# Patient Record
Sex: MALE | Race: BLACK OR AFRICAN AMERICAN | NOT HISPANIC OR LATINO | Employment: FULL TIME | URBAN - METROPOLITAN AREA
[De-identification: names, ages, dates, MRNs, and addresses within clinical notes are randomized per-mention and may not be internally consistent; named-entity substitution may affect disease eponyms.]

---

## 2023-06-30 RX ORDER — AMLODIPINE BESYLATE 5 MG/1
1 TABLET ORAL DAILY
COMMUNITY
Start: 2021-02-19 | End: 2023-07-07 | Stop reason: SDUPTHER

## 2023-06-30 RX ORDER — ATENOLOL AND CHLORTHALIDONE TABLET 50; 25 MG/1; MG/1
1 TABLET ORAL DAILY
COMMUNITY
Start: 2018-07-17 | End: 2023-07-07 | Stop reason: SDUPTHER

## 2023-07-07 ENCOUNTER — OFFICE VISIT (OUTPATIENT)
Dept: PRIMARY CARE | Facility: CLINIC | Age: 48
End: 2023-07-07
Payer: COMMERCIAL

## 2023-07-07 VITALS
HEIGHT: 70 IN | TEMPERATURE: 98.2 F | DIASTOLIC BLOOD PRESSURE: 86 MMHG | RESPIRATION RATE: 17 BRPM | BODY MASS INDEX: 37.55 KG/M2 | OXYGEN SATURATION: 96 % | HEART RATE: 83 BPM | WEIGHT: 262.3 LBS | SYSTOLIC BLOOD PRESSURE: 134 MMHG

## 2023-07-07 DIAGNOSIS — E66.09 CLASS 2 OBESITY DUE TO EXCESS CALORIES WITHOUT SERIOUS COMORBIDITY WITH BODY MASS INDEX (BMI) OF 37.0 TO 37.9 IN ADULT: ICD-10-CM

## 2023-07-07 DIAGNOSIS — I10 HYPERTENSION, UNSPECIFIED TYPE: ICD-10-CM

## 2023-07-07 DIAGNOSIS — Z00.00 PHYSICAL EXAM: Primary | ICD-10-CM

## 2023-07-07 PROCEDURE — 3008F BODY MASS INDEX DOCD: CPT | Performed by: NURSE PRACTITIONER

## 2023-07-07 PROCEDURE — 1036F TOBACCO NON-USER: CPT | Performed by: NURSE PRACTITIONER

## 2023-07-07 PROCEDURE — 3075F SYST BP GE 130 - 139MM HG: CPT | Performed by: NURSE PRACTITIONER

## 2023-07-07 PROCEDURE — 99396 PREV VISIT EST AGE 40-64: CPT | Performed by: NURSE PRACTITIONER

## 2023-07-07 PROCEDURE — 3079F DIAST BP 80-89 MM HG: CPT | Performed by: NURSE PRACTITIONER

## 2023-07-07 RX ORDER — AMLODIPINE BESYLATE 5 MG/1
5 TABLET ORAL DAILY
Qty: 90 TABLET | Refills: 3 | Status: SHIPPED | OUTPATIENT
Start: 2023-07-07 | End: 2024-07-06

## 2023-07-07 RX ORDER — ATENOLOL AND CHLORTHALIDONE TABLET 50; 25 MG/1; MG/1
1 TABLET ORAL DAILY
Qty: 90 TABLET | Refills: 3 | Status: SHIPPED | OUTPATIENT
Start: 2023-07-07 | End: 2024-07-06

## 2023-07-07 ASSESSMENT — PATIENT HEALTH QUESTIONNAIRE - PHQ9
2. FEELING DOWN, DEPRESSED OR HOPELESS: NOT AT ALL
SUM OF ALL RESPONSES TO PHQ9 QUESTIONS 1 AND 2: 0
1. LITTLE INTEREST OR PLEASURE IN DOING THINGS: NOT AT ALL

## 2023-07-07 ASSESSMENT — PAIN SCALES - GENERAL: PAINLEVEL: 0-NO PAIN

## 2023-07-07 NOTE — PATIENT INSTRUCTIONS
"Continue medications as prescribed.  Healthy diet and drink plenty of water.  Please have labs drawn-You will be notified with abnormal results only.    SEE MY CHART FOR RESULTS. If you have any questions please do not hesitate to notify our office.    Please schedule all necessary health screenings ophthalmology and dentist.    Follow-up in 6 MONTHS.  Call office any new concerns.      For weight loss(suggestions)  -whole food diet recommended.  Eating plenty of plant-based foods.  -May try intermittent fasting 16-hour fast and a 8-hour window to eat-there is a movie on [employer] prime call \"fasting\".  -Limit red meat  -Avoid processed foods.  -Increase water intake-please try to drink one half of your body weight in ounces  -Weight yourself every morning  -Get 30 minutes of exercise daily.  -Each meal should have at least 3 to 4 ounces of protein this includes lentils beans tofu greens  -May also do weight training weight training boost to metabolism and help burn fat.  -Sleep is important for weight loss at least 8 hours a night if possible.  -Try to keep a food journal log everything you eat and drink.   - There is an elin you can use called my fitness pal.        "

## 2023-07-07 NOTE — PROGRESS NOTES
"Subjective   Patient ID: Adryan Perez is a 48 y.o. male who presents for Annual Exam (Patient reports that his wife states he sleeps more. ) and Med Refill.    HPI     Patient presents to clinic for yearly exam.  Patient with past medical history of hypertension.    He states he has been doing well-no specific complaints today.   He states his fiancée is concerned and states he has been sleeping more.   He does not feel as though he is sleeping more.  Patient works first shift states he goes to bed at approximately 11 PM and wakes up at 5 AM and when he gets home he naps.  No complaints of snoring.    Appetite normal bowel and bladder normal.      Patient has had a 19 pound weight gain in the last year.  He states he is in a new relationship and admits to eating more and traveling more.  Discussed diet and weight loss with patient.      Review of Systems   All other systems reviewed and are negative.      Objective   /86 (BP Location: Right arm, Patient Position: Sitting, BP Cuff Size: Large adult)   Pulse 83   Temp 36.8 °C (98.2 °F) (Temporal)   Resp 17   Ht 1.778 m (5' 10\")   Wt 119 kg (262 lb 4.8 oz)   SpO2 93%   BMI 37.64 kg/m²     Physical Exam  Vitals reviewed.   Constitutional:       Appearance: Normal appearance.   HENT:      Right Ear: Tympanic membrane and external ear normal.      Left Ear: Tympanic membrane and external ear normal.      Mouth/Throat:      Mouth: Mucous membranes are moist.      Pharynx: Oropharynx is clear.   Eyes:      Pupils: Pupils are equal, round, and reactive to light.   Neck:      Thyroid: No thyroid mass, thyromegaly or thyroid tenderness.   Cardiovascular:      Rate and Rhythm: Normal rate and regular rhythm.   Pulmonary:      Effort: Pulmonary effort is normal.      Breath sounds: Normal breath sounds.   Abdominal:      General: Bowel sounds are normal.      Palpations: Abdomen is soft.   Musculoskeletal:         General: Normal range of motion.      Cervical " back: Normal range of motion and neck supple.   Skin:     General: Skin is warm and dry.   Neurological:      Mental Status: He is alert and oriented to person, place, and time.   Psychiatric:         Mood and Affect: Mood normal.         Assessment/Plan   Problem List Items Addressed This Visit    None  Visit Diagnoses       Physical exam    -  Primary    Relevant Orders    CBC    Comprehensive Metabolic Panel    Lipid Panel    Hemoglobin A1C    Prostate Specific Antigen, Screen    Tsh With Reflex To Free T4 If Abnormal    Vitamin D 25-Hydroxy,Total    Colonoscopy    Hypertension, unspecified type        Relevant Medications    amLODIPine (Norvasc) 5 mg tablet    atenoloL-chlorthalidone (Tenoretic) 50-25 mg tablet

## 2023-08-05 ENCOUNTER — LAB (OUTPATIENT)
Dept: LAB | Facility: LAB | Age: 48
End: 2023-08-05
Payer: COMMERCIAL

## 2023-08-05 DIAGNOSIS — Z00.00 PHYSICAL EXAM: ICD-10-CM

## 2023-08-05 LAB
ALANINE AMINOTRANSFERASE (SGPT) (U/L) IN SER/PLAS: 34 U/L (ref 10–52)
ALBUMIN (G/DL) IN SER/PLAS: 4.5 G/DL (ref 3.4–5)
ALKALINE PHOSPHATASE (U/L) IN SER/PLAS: 102 U/L (ref 33–120)
ANION GAP IN SER/PLAS: 14 MMOL/L (ref 10–20)
ASPARTATE AMINOTRANSFERASE (SGOT) (U/L) IN SER/PLAS: 23 U/L (ref 9–39)
BILIRUBIN TOTAL (MG/DL) IN SER/PLAS: 0.5 MG/DL (ref 0–1.2)
CALCIDIOL (25 OH VITAMIN D3) (NG/ML) IN SER/PLAS: 25 NG/ML
CALCIUM (MG/DL) IN SER/PLAS: 9.7 MG/DL (ref 8.6–10.6)
CARBON DIOXIDE, TOTAL (MMOL/L) IN SER/PLAS: 26 MMOL/L (ref 21–32)
CHLORIDE (MMOL/L) IN SER/PLAS: 107 MMOL/L (ref 98–107)
CHOLESTEROL (MG/DL) IN SER/PLAS: 218 MG/DL (ref 0–199)
CHOLESTEROL IN HDL (MG/DL) IN SER/PLAS: 47.7 MG/DL
CHOLESTEROL/HDL RATIO: 4.6
CREATININE (MG/DL) IN SER/PLAS: 1.04 MG/DL (ref 0.5–1.3)
ERYTHROCYTE DISTRIBUTION WIDTH (RATIO) BY AUTOMATED COUNT: 12.3 % (ref 11.5–14.5)
ERYTHROCYTE MEAN CORPUSCULAR HEMOGLOBIN CONCENTRATION (G/DL) BY AUTOMATED: 33.5 G/DL (ref 32–36)
ERYTHROCYTE MEAN CORPUSCULAR VOLUME (FL) BY AUTOMATED COUNT: 94 FL (ref 80–100)
ERYTHROCYTES (10*6/UL) IN BLOOD BY AUTOMATED COUNT: 5.06 X10E12/L (ref 4.5–5.9)
ESTIMATED AVERAGE GLUCOSE FOR HBA1C: 131 MG/DL
GFR MALE: 87 ML/MIN/1.73M2
GLUCOSE (MG/DL) IN SER/PLAS: 107 MG/DL (ref 74–99)
HEMATOCRIT (%) IN BLOOD BY AUTOMATED COUNT: 47.4 % (ref 41–52)
HEMOGLOBIN (G/DL) IN BLOOD: 15.9 G/DL (ref 13.5–17.5)
HEMOGLOBIN A1C/HEMOGLOBIN TOTAL IN BLOOD: 6.2 %
LDL: 147 MG/DL (ref 0–99)
LEUKOCYTES (10*3/UL) IN BLOOD BY AUTOMATED COUNT: 4.8 X10E9/L (ref 4.4–11.3)
NRBC (PER 100 WBCS) BY AUTOMATED COUNT: 0 /100 WBC (ref 0–0)
PLATELETS (10*3/UL) IN BLOOD AUTOMATED COUNT: 266 X10E9/L (ref 150–450)
POTASSIUM (MMOL/L) IN SER/PLAS: 4.4 MMOL/L (ref 3.5–5.3)
PROSTATE SPECIFIC ANTIGEN,SCREEN: 7.47 NG/ML (ref 0–4)
PROTEIN TOTAL: 7.2 G/DL (ref 6.4–8.2)
SODIUM (MMOL/L) IN SER/PLAS: 143 MMOL/L (ref 136–145)
THYROTROPIN (MIU/L) IN SER/PLAS BY DETECTION LIMIT <= 0.05 MIU/L: 1.36 MIU/L (ref 0.44–3.98)
TRIGLYCERIDE (MG/DL) IN SER/PLAS: 116 MG/DL (ref 0–149)
UREA NITROGEN (MG/DL) IN SER/PLAS: 14 MG/DL (ref 6–23)
VLDL: 23 MG/DL (ref 0–40)

## 2023-08-05 PROCEDURE — 36415 COLL VENOUS BLD VENIPUNCTURE: CPT

## 2023-08-05 PROCEDURE — 82306 VITAMIN D 25 HYDROXY: CPT

## 2023-08-05 PROCEDURE — 84443 ASSAY THYROID STIM HORMONE: CPT

## 2023-08-05 PROCEDURE — 83036 HEMOGLOBIN GLYCOSYLATED A1C: CPT

## 2023-08-05 PROCEDURE — 80053 COMPREHEN METABOLIC PANEL: CPT

## 2023-08-05 PROCEDURE — 85027 COMPLETE CBC AUTOMATED: CPT

## 2023-08-05 PROCEDURE — 80061 LIPID PANEL: CPT

## 2023-08-05 PROCEDURE — 84153 ASSAY OF PSA TOTAL: CPT

## 2023-08-07 DIAGNOSIS — R97.20 ELEVATED PSA: Primary | ICD-10-CM

## 2023-08-07 NOTE — PROGRESS NOTES
Phoned patient to discuss lab results vitamin D insufficient at 25 patient will start over-the-counter vitamin D3 advised 2000 international units 1 tablet daily.  PSA level elevated at 7.47 urology referral has been submitted-Patient to schedule.  Hemoglobin A1c 6.2, serum cholesterol 218, .  Discussed healthy lifestyle changes with diet and exercise.  Incorporating low-cholesterol low-fat foods eating more plant-based foods and closely monitoring carbohydrates and sugars in the diet.  Recheck labs next visit.

## 2023-12-01 ENCOUNTER — ANESTHESIA EVENT (OUTPATIENT)
Dept: GASTROENTEROLOGY | Facility: HOSPITAL | Age: 48
End: 2023-12-01

## 2023-12-01 ENCOUNTER — ANESTHESIA (OUTPATIENT)
Dept: GASTROENTEROLOGY | Facility: HOSPITAL | Age: 48
End: 2023-12-01
Payer: COMMERCIAL

## 2024-05-23 ENCOUNTER — OFFICE VISIT (OUTPATIENT)
Dept: PRIMARY CARE | Facility: CLINIC | Age: 49
End: 2024-05-23
Payer: COMMERCIAL

## 2024-05-23 VITALS — BODY MASS INDEX: 37.59 KG/M2 | HEART RATE: 76 BPM | OXYGEN SATURATION: 98 % | WEIGHT: 262 LBS

## 2024-05-23 DIAGNOSIS — Z76.89 ENCOUNTER TO ESTABLISH CARE: Primary | ICD-10-CM

## 2024-05-23 DIAGNOSIS — H65.93 MIDDLE EAR EFFUSION, BILATERAL: ICD-10-CM

## 2024-05-23 DIAGNOSIS — I10 PRIMARY HYPERTENSION: ICD-10-CM

## 2024-05-23 DIAGNOSIS — Q03.9 HYDROCEPHALUS, CONGENITAL (MULTI): ICD-10-CM

## 2024-05-23 DIAGNOSIS — H93.8X3 EAR FULLNESS, BILATERAL: ICD-10-CM

## 2024-05-23 PROCEDURE — 99214 OFFICE O/P EST MOD 30 MIN: CPT | Performed by: STUDENT IN AN ORGANIZED HEALTH CARE EDUCATION/TRAINING PROGRAM

## 2024-05-23 PROCEDURE — 1036F TOBACCO NON-USER: CPT | Performed by: STUDENT IN AN ORGANIZED HEALTH CARE EDUCATION/TRAINING PROGRAM

## 2024-05-23 PROCEDURE — 3008F BODY MASS INDEX DOCD: CPT | Performed by: STUDENT IN AN ORGANIZED HEALTH CARE EDUCATION/TRAINING PROGRAM

## 2024-05-23 RX ORDER — METHYLPREDNISOLONE 4 MG/1
TABLET ORAL
Qty: 21 TABLET | Refills: 0 | Status: SHIPPED | OUTPATIENT
Start: 2024-05-23 | End: 2024-05-30

## 2024-05-23 NOTE — PROGRESS NOTES
Subjective   Patient ID: Adryan Perez is a 48 y.o. male who presents for Ear Fullness (New patient here for left ear fullness).        HPI      Left ear fullness gradual for the last 2 weeks  with worsening this morning   No hearing loss   No dizziness   No recent URI   Air travel in Feb , lot of swimming in Feb   Denies any seasonal allergies except to white pollen but is not symptomatic enough that he takes meds     Est care   Pt's PMH, PSH, SH, FH , meds and allergies was obtained / reviewed and updated .     hTN            Visit Vitals  Pulse 76   Wt 119 kg (262 lb)   SpO2 98%   BMI 37.59 kg/m²   Smoking Status Former   BSA 2.42 m²      No LMP for male patient.   Current Outpatient Medications   Medication Instructions    amLODIPine (NORVASC) 5 mg, oral, Daily    atenoloL-chlorthalidone (Tenoretic) 50-25 mg tablet 1 tablet, oral, Daily    methylPREDNISolone (Medrol Dospak) 4 mg tablets Take as directed on package.      Social History     Tobacco Use    Smoking status: Former     Types: Cigars     Quit date: 2006     Years since quittin.4    Smokeless tobacco: Never   Substance Use Topics    Alcohol use: Not on file        Review of Systems    Constitutional : No feeling poorly / fevers/ chills / night sweats/ fatigue   Cardiovascular : No CP /Palpitations/ lower extremity edema / syncope   Respiratory : No Cough /KERR/Dyspnea at rest   Gastrointestinal : No abd pain / N/V  No bloody stools/ melena / constipation  Endo : No polyuria/polydipsia/ muscle weakness / sluggishness   CNS: No confusion / HA/ tingling/ numbness/ weakness of extremities  Psychiatric: No anxiety/ depression/ SI/HI    All other systems have been reviewed and are negative for complaint       Physical Exam    Constitutional : Vitals reviewed. Alert and in no distress  ENT :  erythematous Tms with dull light reflex   Right nostril - turbinated edema noted   Cardiovascular : RRR, Normal S1, S2, No pericardial rub/ gallop, no peripheral  edema   Pulmonary: No respiratory distress, CTAB   MSK : Normal gait and station , strength and tone   Skin: Warm to touch ,  normal skin turgor   Neurologic : CNs 2-12 grossly intact , no obvious FNDs  Psych : A,Ox3, normal mood and affect      Assessment/Plan   Diagnoses and all orders for this visit:  Encounter to establish care  Primary hypertension  -     CBC; Future  -     Comprehensive Metabolic Panel; Future  -     Hemoglobin A1C; Future  -     Lipid Panel; Future  -     TSH with reflex to Free T4 if abnormal; Future  Ear fullness, bilateral  -     methylPREDNISolone (Medrol Dospak) 4 mg tablets; Take as directed on package.  Middle ear effusion, bilateral  -     methylPREDNISolone (Medrol Dospak) 4 mg tablets; Take as directed on package.  Hydrocephalus, congenital (Multi)  Comments:  S/p shunt placement             47 y/o male with HTN, H/o congenital hydrocephalus with shunt placement presents to est care and left ear fullness     # Left ear fullness with no hearing loss   B/l middle ear effusion with right nasal turbinate edema noted   Rx with medrol dose pack   Flonase and antihistamine prn use     # HTN : at goal     RTO in 3m for CPE , labs to be done prior to the visit       Conditions addressed and mgmt as noted above.  Pertinent labs, images/ imaging reports , chart review was done .   Age appropriate labs / labs for mgmt of chronic medical conditions ordered, further mgmt pending the results.

## 2024-08-27 ENCOUNTER — APPOINTMENT (OUTPATIENT)
Dept: PRIMARY CARE | Facility: CLINIC | Age: 49
End: 2024-08-27
Payer: COMMERCIAL

## 2024-09-09 DIAGNOSIS — I10 HYPERTENSION, UNSPECIFIED TYPE: ICD-10-CM

## 2024-09-12 RX ORDER — AMLODIPINE BESYLATE 5 MG/1
5 TABLET ORAL DAILY
Qty: 90 TABLET | Refills: 0 | Status: SHIPPED | OUTPATIENT
Start: 2024-09-12

## 2024-09-12 RX ORDER — ATENOLOL AND CHLORTHALIDONE TABLET 50; 25 MG/1; MG/1
1 TABLET ORAL DAILY
Qty: 90 TABLET | Refills: 0 | Status: SHIPPED | OUTPATIENT
Start: 2024-09-12

## 2024-10-03 ENCOUNTER — APPOINTMENT (OUTPATIENT)
Dept: PRIMARY CARE | Facility: CLINIC | Age: 49
End: 2024-10-03
Payer: COMMERCIAL

## 2024-10-03 VITALS
TEMPERATURE: 98 F | DIASTOLIC BLOOD PRESSURE: 96 MMHG | SYSTOLIC BLOOD PRESSURE: 145 MMHG | BODY MASS INDEX: 37.33 KG/M2 | HEART RATE: 92 BPM | OXYGEN SATURATION: 96 % | WEIGHT: 260.2 LBS

## 2024-10-03 DIAGNOSIS — Z00.00 ROUTINE GENERAL MEDICAL EXAMINATION AT A HEALTH CARE FACILITY: Primary | ICD-10-CM

## 2024-10-03 DIAGNOSIS — E78.2 MIXED HYPERLIPIDEMIA: ICD-10-CM

## 2024-10-03 DIAGNOSIS — Z12.5 SCREENING FOR PROSTATE CANCER: ICD-10-CM

## 2024-10-03 DIAGNOSIS — I10 PRIMARY HYPERTENSION: ICD-10-CM

## 2024-10-03 DIAGNOSIS — I10 HYPERTENSION, UNSPECIFIED TYPE: ICD-10-CM

## 2024-10-03 DIAGNOSIS — R73.03 PREDIABETES: ICD-10-CM

## 2024-10-03 DIAGNOSIS — Z12.11 SCREENING FOR COLON CANCER: ICD-10-CM

## 2024-10-03 PROCEDURE — 1036F TOBACCO NON-USER: CPT | Performed by: STUDENT IN AN ORGANIZED HEALTH CARE EDUCATION/TRAINING PROGRAM

## 2024-10-03 PROCEDURE — 99396 PREV VISIT EST AGE 40-64: CPT | Performed by: STUDENT IN AN ORGANIZED HEALTH CARE EDUCATION/TRAINING PROGRAM

## 2024-10-03 PROCEDURE — 3077F SYST BP >= 140 MM HG: CPT | Performed by: STUDENT IN AN ORGANIZED HEALTH CARE EDUCATION/TRAINING PROGRAM

## 2024-10-03 PROCEDURE — 3080F DIAST BP >= 90 MM HG: CPT | Performed by: STUDENT IN AN ORGANIZED HEALTH CARE EDUCATION/TRAINING PROGRAM

## 2024-10-03 PROCEDURE — 99214 OFFICE O/P EST MOD 30 MIN: CPT | Performed by: STUDENT IN AN ORGANIZED HEALTH CARE EDUCATION/TRAINING PROGRAM

## 2024-10-03 RX ORDER — AMLODIPINE BESYLATE 10 MG/1
10 TABLET ORAL DAILY
Qty: 90 TABLET | Refills: 1 | Status: SHIPPED | OUTPATIENT
Start: 2024-10-03

## 2024-10-03 NOTE — PROGRESS NOTES
Subjective   Patient ID: Adryan Perez is a 49 y.o. male who presents for  CPE (For his ears/ No flu shot).    Last Physical : ____ Years ago     Pt's PMH, PSH, SH, FH , meds and allergies was obtained / reviewed and updated .     Dental  Visits : Y  Vision issues : Y  Hearing issues : N    Immunizations : Y    Diet :    Exercise:  Not regularly  Weight concerns : None    Alcohol: as noted in   Tobacco: as noted in   Recreational drugs :  None /as noted in          ==================================    Visit Vitals  BP (!) 145/96 (BP Location: Right arm, Patient Position: Sitting, BP Cuff Size: Large adult)   Pulse 92   Temp 36.7 °C (98 °F)   Wt 118 kg (260 lb 3.2 oz)   SpO2 96%   BMI 37.33 kg/m²   Smoking Status Former   BSA 2.41 m²      Current Outpatient Medications   Medication Instructions    amLODIPine (NORVASC) 10 mg, oral, Daily    atenoloL-chlorthalidone (Tenoretic) 50-25 mg tablet 1 tablet, oral, Daily      Social History     Tobacco Use    Smoking status: Former     Types: Cigars     Quit date: 2006     Years since quittin.8    Smokeless tobacco: Never   Substance Use Topics    Alcohol use: Not on file      =====================  Review of Systems:    Constitutional: no chills, no fever and no night sweats.     Eyes: no blurred vision and no eyesight problems.     ENT: no hearing loss, no nasal congestion, no nasal discharge, no hoarseness and no sore throat.     Cardiovascular: no chest pain, no intermittent leg claudication, no lower extremity edema, no palpitations and no syncope.     Respiratory: no cough, no shortness of breath during exertion, no shortness of breath at rest and no wheezing.     Gastrointestinal: no abdominal pain, no constipation, no blood in stools, no diarrhea, no melena, no nausea, no rectal pain and no vomiting.     Genitourinary: no dysuria, no change in urinary frequency, no urinary hesitancy and no feelings of urinary urgency.     Musculoskeletal: no  arthralgias, no back pain and no myalgias.     Integumentary: no new skin lesions and no rashes.     Neurological: no difficulty walking, no headache, no limb weakness, no numbness and no tingling.     Psychiatric: no anxiety, no depression, no anhedonia and no substance use disorders.     Endocrine: no recent weight gain and no recent weight loss.     Hematologic/Lymphatic: no tendency for easy bruising and no swollen glands.          All other systems have been reviewed and are negative for complaint.    =====================================================    Physical exam :    Constitutional: Alert and in no acute distress. Well developed, well nourished.     Eyes: Normal external exam. Pupils were equal in size, round, reactive to light (PERRL) with normal accommodation and extraocular movements intact (EOMI).     Ears, Nose, Mouth, and Throat: External inspection of ears and nose: Normal.  Otoscopic examination: Normal.      Neck: No neck mass was observed. Supple.     Cardiovascular: Heart rate and rhythm were normal, normal S1 and S2, no gallops, no murmurs and no pericardial rub    Pulmonary: No respiratory distress. Clear bilateral breath sounds.     Abdomen: Soft nontender; no abdominal mass palpated. No organomegaly.     Musculoskeletal: No joint swelling seen, normal movements of all extremities. Range of motion: Normal.  Muscle strength/tone: Normal.      Neurologic: Deep tendon reflexes were 2+ and symmetric. Sensation: Normal.     Psychiatric: Judgment and insight: Intact. Mood and affect: Normal.        Assessment/Plan    Diagnoses and all orders for this visit:  Routine general medical examination at a health care facility  Primary hypertension  Screening for prostate cancer  -     Prostate Specific Antigen, Screen; Future  Screening for colon cancer  -     Colonoscopy Screening; Average Risk Patient; Future  Hypertension, unspecified type  -     amLODIPine (Norvasc) 10 mg tablet; Take 1 tablet (10  mg) by mouth once daily.  Mixed hyperlipidemia  Prediabetes       Increase Amlodipine to 10mg   Continue BB- diuretic     Labs to be done soon     HM :   Vaccines: declined   -Tdap :  -Flu :   -Shingles :     Cancer screenings:  -Colonoscopy:  to schedule  -PSA: ordered      General preventive care discussed which includes regular exercise, healthy eating habits, to include more of plant based diet, to include foods of all colors  , limiting excessive red meat intake , staying active to maintain a weight that is appropriate for his/ her height / making efforts to reach an ideal weight for height,  avoidance of smoking, excess alcohol consumption, avoidance of recreational drugs, safe and responsible sexual relationships and practices.     It is recommended to get 150 mins of  moderate intensity  ( you should be able to talk and not sing ) exercise in a week .     Calcium + Vit D supplements recommended   Regular exercise recommended   Healthy eating choices discussed and recommended   BMI ( Body mass index ) discussed and encouraged weight loss through the above discussed lifestyle modifications .     RTO in Dec for BP check     Conditions addressed and mgmt as noted above.  Pertinent labs, images/ imaging reports , chart review was done .   Age appropriate labs / labs for mgmt of chronic medical conditions ordered, further mgmt pending the results.       This note is intended for the physician writing it, as well as to communicate findings to other healthcare professionals. These notes use medical lexicon that may be misunderstood by non medical persons. Therefore, interpretations of medical notes and terminology should be approached with caution.

## 2024-10-03 NOTE — PROGRESS NOTES
Subjective   Patient ID: Adryan Perez is a 49 y.o. male who presents for Follow-up (For his ears/ No flu shot).        HPI      50 y/o male with HTN, H/o congenital hydrocephalus with shunt placement presents to est care and left ear fullness               Visit Vitals  BP (!) 145/96 (BP Location: Right arm, Patient Position: Sitting, BP Cuff Size: Large adult)   Pulse 92   Temp 36.7 °C (98 °F)   Wt 118 kg (260 lb 3.2 oz)   SpO2 96%   BMI 37.33 kg/m²   Smoking Status Former   BSA 2.41 m²      No LMP for male patient.   Current Outpatient Medications   Medication Instructions    amLODIPine (NORVASC) 5 mg, oral, Daily    atenoloL-chlorthalidone (Tenoretic) 50-25 mg tablet 1 tablet, oral, Daily      Social History     Tobacco Use    Smoking status: Former     Types: Cigars     Quit date: 2006     Years since quittin.8    Smokeless tobacco: Never   Substance Use Topics    Alcohol use: Not on file        Review of Systems    Constitutional : No feeling poorly / fevers/ chills / night sweats/ fatigue   Cardiovascular : No CP /Palpitations/ lower extremity edema / syncope   Respiratory : No Cough /KERR/Dyspnea at rest   Gastrointestinal : No abd pain / N/V  No bloody stools/ melena / constipation  Endo : No polyuria/polydipsia/ muscle weakness / sluggishness   CNS: No confusion / HA/ tingling/ numbness/ weakness of extremities  Psychiatric: No anxiety/ depression/ SI/HI    All other systems have been reviewed and are negative for complaint       Physical Exam    Constitutional : Vitals reviewed. Alert and in no distress  Cardiovascular : RRR, Normal S1, S2, No pericardial rub/ gallop, no peripheral edema   Pulmonary: No respiratory distress, CTAB   MSK : Normal gait and station , strength and tone   Skin: Warm to touch ,  normal skin turgor   Neurologic : CNs 2-12 grossly intact , no obvious FNDs  Psych : A,Ox3, normal mood and affect      Assessment/Plan   {Assess/PlanSmartLinks:06487}                Conditions  addressed and mgmt as noted above.  Pertinent labs, images/ imaging reports , chart review was done .   Age appropriate labs / labs for mgmt of chronic medical conditions ordered, further mgmt pending the results.       This note is intended for the physician writing it, as well as to communicate findings to other healthcare professionals. These notes use medical lexicon that may be misunderstood by non medical persons. Therefore, interpretations of medical notes and terminology should be approached with caution.

## 2024-10-03 NOTE — PATIENT INSTRUCTIONS
Blood sugars are elevated  in the PREDIABETIC RANGE on review of labs from 2023 , advise to cut down on carbs/ starches - bread, rice , potatoes, pasta , sweets, soda etc., and increase physical activity .   Weight loss will  help in decreasing blood sugars  as well.        We are on a new system that is paperless.     Lab location for blood work :  1. Located across the office , just past the elevators .   2. [address].  If you are coming on a Saturday, go to [address] for the blood draw    Radiology : [address] for Xrays  You can also schedule non urgent imaging by calling .     For scheduling appts, call . You might be receiving call from central scheduling as well.    For scheduling colonoscopy, call .     For scheduling physical therapy, call [phone] ( [phone]).    For any other scheduling questions or locations, please ask the medical assistant or the .

## 2024-10-12 ENCOUNTER — LAB (OUTPATIENT)
Dept: LAB | Facility: LAB | Age: 49
End: 2024-10-12
Payer: COMMERCIAL

## 2024-10-12 DIAGNOSIS — I10 PRIMARY HYPERTENSION: ICD-10-CM

## 2024-10-12 DIAGNOSIS — Z12.5 SCREENING FOR PROSTATE CANCER: ICD-10-CM

## 2024-10-12 LAB
ALBUMIN SERPL BCP-MCNC: 4.7 G/DL (ref 3.4–5)
ALP SERPL-CCNC: 91 U/L (ref 33–120)
ALT SERPL W P-5'-P-CCNC: 32 U/L (ref 10–52)
ANION GAP SERPL CALC-SCNC: 12 MMOL/L (ref 10–20)
AST SERPL W P-5'-P-CCNC: 28 U/L (ref 9–39)
BILIRUB SERPL-MCNC: 0.6 MG/DL (ref 0–1.2)
BUN SERPL-MCNC: 19 MG/DL (ref 6–23)
CALCIUM SERPL-MCNC: 9.3 MG/DL (ref 8.6–10.3)
CHLORIDE SERPL-SCNC: 94 MMOL/L (ref 98–107)
CHOLEST SERPL-MCNC: 209 MG/DL (ref 0–199)
CHOLESTEROL/HDL RATIO: 4.5
CO2 SERPL-SCNC: 31 MMOL/L (ref 21–32)
CREAT SERPL-MCNC: 1.14 MG/DL (ref 0.5–1.3)
EGFRCR SERPLBLD CKD-EPI 2021: 78 ML/MIN/1.73M*2
ERYTHROCYTE [DISTWIDTH] IN BLOOD BY AUTOMATED COUNT: 12.3 % (ref 11.5–14.5)
EST. AVERAGE GLUCOSE BLD GHB EST-MCNC: 131 MG/DL
GLUCOSE SERPL-MCNC: 93 MG/DL (ref 74–99)
HBA1C MFR BLD: 6.2 %
HCT VFR BLD AUTO: 46.2 % (ref 41–52)
HDLC SERPL-MCNC: 46.4 MG/DL
HGB BLD-MCNC: 15.4 G/DL (ref 13.5–17.5)
LDLC SERPL CALC-MCNC: 142 MG/DL
MCH RBC QN AUTO: 30.6 PG (ref 26–34)
MCHC RBC AUTO-ENTMCNC: 33.3 G/DL (ref 32–36)
MCV RBC AUTO: 92 FL (ref 80–100)
NON HDL CHOLESTEROL: 163 MG/DL (ref 0–149)
NRBC BLD-RTO: 0 /100 WBCS (ref 0–0)
PLATELET # BLD AUTO: 291 X10*3/UL (ref 150–450)
POTASSIUM SERPL-SCNC: 3.8 MMOL/L (ref 3.5–5.3)
PROT SERPL-MCNC: 7 G/DL (ref 6.4–8.2)
PSA SERPL-MCNC: 10.28 NG/ML
RBC # BLD AUTO: 5.03 X10*6/UL (ref 4.5–5.9)
SODIUM SERPL-SCNC: 133 MMOL/L (ref 136–145)
TRIGL SERPL-MCNC: 105 MG/DL (ref 0–149)
TSH SERPL-ACNC: 2.75 MIU/L (ref 0.44–3.98)
VLDL: 21 MG/DL (ref 0–40)
WBC # BLD AUTO: 6.4 X10*3/UL (ref 4.4–11.3)

## 2024-10-12 PROCEDURE — 84443 ASSAY THYROID STIM HORMONE: CPT

## 2024-10-12 PROCEDURE — 83036 HEMOGLOBIN GLYCOSYLATED A1C: CPT

## 2024-10-12 PROCEDURE — 85027 COMPLETE CBC AUTOMATED: CPT

## 2024-10-12 PROCEDURE — 84153 ASSAY OF PSA TOTAL: CPT

## 2024-10-12 PROCEDURE — 80061 LIPID PANEL: CPT

## 2024-10-12 PROCEDURE — 36415 COLL VENOUS BLD VENIPUNCTURE: CPT

## 2024-10-12 PROCEDURE — 80053 COMPREHEN METABOLIC PANEL: CPT

## 2024-10-29 DIAGNOSIS — Z12.11 COLON CANCER SCREENING: Primary | ICD-10-CM

## 2024-10-30 RX ORDER — SODIUM, POTASSIUM,MAG SULFATES 17.5-3.13G
SOLUTION, RECONSTITUTED, ORAL ORAL
Qty: 354 ML | Refills: 0 | Status: SHIPPED | OUTPATIENT
Start: 2024-10-30

## 2024-11-27 ENCOUNTER — APPOINTMENT (OUTPATIENT)
Dept: GASTROENTEROLOGY | Facility: EXTERNAL LOCATION | Age: 49
End: 2024-11-27
Payer: COMMERCIAL

## 2024-11-27 DIAGNOSIS — R97.20 ELEVATED PSA: Primary | ICD-10-CM

## 2024-12-19 ENCOUNTER — APPOINTMENT (OUTPATIENT)
Dept: PRIMARY CARE | Facility: CLINIC | Age: 49
End: 2024-12-19
Payer: COMMERCIAL

## 2025-01-02 NOTE — PROGRESS NOTES
[facility]  Outpatient Clinic Note    Patient Name:  Adryan Perez  MRN:  [id number]  :  1975    Referring Provider: Carmen Gentile,*  Date of Service: 1/3/2025   Visit type: New patient visit     problem list/Chief complaint:  Elevated PSA  - 10.28 on 10/12/24      HISTORY OF PRESENT ILLNESS:  Adryan Perez is a 49 y.o. male with past medical history of HTN, mixed HLD, prediabetes, who presents for initial Urology visit. I performed a detailed review of the medical chart records lab testing and imaging. Patient referred to Urology by his primary care for elevated PSA.  Patient is accompanied by his wife. He shares that he drinks a lot of water daily, drinks alcohol on special occasions and only drinks soda sometimes. PVR 46 ml.    Urinary Difficulties? No dysuria, has strong urinary stream, nocturia x 1, no hematuria, no fever or chills.  Unexpected weight loss? No  Bone pain? No  Fatigue? No fatigue  Family history of prostate cancer? No  Previous biopsy? No  Smoker? Former smoker in .      PAST MEDICAL HISTORY:  Past Medical History:   Diagnosis Date    Congenital hydrocephalus, unspecified 2017    Congenital hydrocephalus    Encounter for general adult medical examination without abnormal findings 2021    Encounter for health maintenance examination    Encounter for general adult medical examination without abnormal findings     Encounter for preventive health examination    Encounter for general adult medical examination without abnormal findings 2018    Encounter for health maintenance examination    Essential (primary) hypertension 2019    HTN (hypertension), benign    Essential (primary) hypertension 2018    HTN (hypertension), benign    Essential (primary) hypertension 2018    HTN (hypertension), benign    Essential (primary) hypertension 2020    HTN (hypertension), benign    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[facility])     Cataplexy    Old  "myocardial infarction     History of heart attack    Personal history of other specified conditions 2019    History of dizziness    Prediabetes 2018    Prediabetes    Prediabetes 2018    Prediabetes    Prediabetes 2020    Prediabetes       PAST SURGICAL HISTORY:  Past Surgical History:   Procedure Laterality Date    OTHER SURGICAL HISTORY  2020    Ventriculoperitoneal shunt creation       ALLERGIES:  No Known Allergies    MEDICATIONS:  Current Outpatient Medications   Medication Instructions    amLODIPine (NORVASC) 10 mg, oral, Daily    atenoloL-chlorthalidone (Tenoretic) 50-25 mg tablet 1 tablet, oral, Daily    sodium,potassium,mag sulfates (Suprep) 17.5-3.13-1.6 gram recon soln solution Take one bottle twice as directed by the prep instructions        SOCIAL HISTORY:  Social History     Tobacco Use    Smoking status: Former     Types: Cigars     Quit date: 2006     Years since quittin.1    Smokeless tobacco: Never        FAMILY HISTORY:  Family History   Problem Relation Name Age of Onset    Hypertension Mother      Hypertension Father      Hypertension Brother      Hypertension Brother      No Known Problems Daughter      No Known Problems Son      No Known Problems Mother's Sister      No Known Problems Mother's Brother      No Known Problems Father's Sister      No Known Problems Father's Brother      No Known Problems Maternal Grandmother      Hypertension Maternal Grandfather      Hypertension Paternal Grandmother      Hypertension Paternal Grandfather          REVIEW OF SYSTEMS:  10-pt ROS reviewed and negative except as mentioned above.    Vital signs:  Height 1.753 m (5' 9\"), weight 107 kg (235 lb).    PHYSICAL EXAMINATION:  General: Appears comfortable and in no apparent distress.  Head: Normocephalic, atraumatic  Eyes: Non-injected conjunctiva, sclera clear, no proptosis  Lungs: Breathing is easy, non-labored. Speaking in clear and complete sentences. Normal " diaphragmatic movement.  Cardiovascular: no peripheral edema, cyanosis or pallor.   Abdomen: soft, non-distended, non-tender  : Bladder: non tender, not distended  MSK: Ambulatory with steady gait, unassisted  Skin: No visible rashes or lesions  Neurologic: Alert, oriented to person, place, and time  Psychiatric: mood and affect appropriate    LABORATORY DATA:    Office Visit on 01/03/2025   Component Date Value    POC Color, Urine 01/03/2025 Yellow     POC Appearance, Urine 01/03/2025 Clear     POC Glucose, Urine 01/03/2025 NEGATIVE     POC Bilirubin, Urine 01/03/2025 NEGATIVE     POC Ketones, Urine 01/03/2025 NEGATIVE     POC Specific Gravity, Ur* 01/03/2025 1.015     POC Blood, Urine 01/03/2025 NEGATIVE     POC PH, Urine 01/03/2025 5.5     POC Protein, Urine 01/03/2025 NEGATIVE     POC Urobilinogen, Urine 01/03/2025 0.2     Poc Nitrite, Urine 01/03/2025 NEGATIVE     POC Leukocytes, Urine 01/03/2025 NEGATIVE      Lab Results   Component Value Date    PSA 4.35 (H) 02/19/2021    PSA 3.98 02/17/2020    PSA 1.83 01/12/2018       ASSESSMENT:  Adryan Perez is a 49 y.o. male with elevated PSA, who presents for initial Urology visit.    1.Today we discussed PSA as a tool to screen gentleman for prostate cancer. We discussed that many factors can elevate the PSA, and when the PSA is elevated further testing is warranted.     I discussed with the patient that the prostate MRI has a high sensitivity for high-grade prostate cancer lesions but a lower sensitivity for low to intermediate prostate cancer lesions. We discussed that currently MRI is not considered to be a standard of care for prostate cancer screening, therefore self-pay option is available.    PLAN:  -UA today negative for infection  -PVR 46 ml  -MRI prostate ordered to rule out malignancy  -Will repeat PSA today to monitor  -Prostate specific 4k score ordered for elevated PSA  -Follow up after MRI prostate, or sooner if needed    All questions and concerns  were addressed. Patient verbalizes understanding and has no other questions at this time.     E&M visit today is associated with current or anticipated ongoing medical care services related to a patient's single, serious condition or a complex condition.    ADE Gurrola-CNP  [facility]  1/3/2025 2:40 PM

## 2025-01-03 ENCOUNTER — LAB (OUTPATIENT)
Dept: LAB | Facility: LAB | Age: 50
End: 2025-01-03
Payer: COMMERCIAL

## 2025-01-03 ENCOUNTER — ANESTHESIA EVENT (OUTPATIENT)
Dept: GASTROENTEROLOGY | Facility: EXTERNAL LOCATION | Age: 50
End: 2025-01-03

## 2025-01-03 ENCOUNTER — OFFICE VISIT (OUTPATIENT)
Dept: UROLOGY | Facility: HOSPITAL | Age: 50
End: 2025-01-03
Payer: COMMERCIAL

## 2025-01-03 VITALS — WEIGHT: 235 LBS | HEIGHT: 69 IN | BODY MASS INDEX: 34.8 KG/M2

## 2025-01-03 DIAGNOSIS — R97.20 ELEVATED PSA: ICD-10-CM

## 2025-01-03 DIAGNOSIS — R97.20 ELEVATED PSA: Primary | ICD-10-CM

## 2025-01-03 LAB
POC APPEARANCE, URINE: CLEAR
POC BILIRUBIN, URINE: NEGATIVE
POC BLOOD, URINE: NEGATIVE
POC COLOR, URINE: YELLOW
POC GLUCOSE, URINE: NEGATIVE MG/DL
POC KETONES, URINE: NEGATIVE MG/DL
POC LEUKOCYTES, URINE: NEGATIVE
POC NITRITE,URINE: NEGATIVE
POC PH, URINE: 5.5 PH
POC PROTEIN, URINE: NEGATIVE MG/DL
POC SPECIFIC GRAVITY, URINE: 1.01
POC UROBILINOGEN, URINE: 0.2 EU/DL
PSA SERPL-MCNC: 11.08 NG/ML

## 2025-01-03 PROCEDURE — 51798 US URINE CAPACITY MEASURE: CPT | Performed by: NURSE PRACTITIONER

## 2025-01-03 PROCEDURE — 84153 ASSAY OF PSA TOTAL: CPT

## 2025-01-03 PROCEDURE — 1036F TOBACCO NON-USER: CPT | Performed by: NURSE PRACTITIONER

## 2025-01-03 PROCEDURE — G2211 COMPLEX E/M VISIT ADD ON: HCPCS | Performed by: NURSE PRACTITIONER

## 2025-01-03 PROCEDURE — 81003 URINALYSIS AUTO W/O SCOPE: CPT | Performed by: NURSE PRACTITIONER

## 2025-01-03 PROCEDURE — 99214 OFFICE O/P EST MOD 30 MIN: CPT | Mod: 25 | Performed by: NURSE PRACTITIONER

## 2025-01-03 PROCEDURE — 3008F BODY MASS INDEX DOCD: CPT | Performed by: NURSE PRACTITIONER

## 2025-01-03 PROCEDURE — 99204 OFFICE O/P NEW MOD 45 MIN: CPT | Performed by: NURSE PRACTITIONER

## 2025-01-03 ASSESSMENT — PAIN SCALES - GENERAL: PAINLEVEL_OUTOF10: 0-NO PAIN

## 2025-01-15 LAB
4K - CLINICAL INDICATION FOR ORDER: ABNORMAL
PHYS FIND RECTUM: ABNORMAL
PROSTATE PATH BX REPORT: ABNORMAL
PSA FREE MFR SERPL: 6 %
PSA FREE SERPL IA-MCNC: 0.84 NG/ML
PSA SERPL IA-MCNC: 13.01 NG/ML
REF LAB TEST RESULTS: 66.5

## 2025-01-17 ENCOUNTER — HOSPITAL ENCOUNTER (OUTPATIENT)
Dept: RADIOLOGY | Facility: HOSPITAL | Age: 50
Discharge: HOME | End: 2025-01-17
Payer: COMMERCIAL

## 2025-01-17 DIAGNOSIS — R97.20 ELEVATED PSA: ICD-10-CM

## 2025-01-17 PROCEDURE — 6100000002 MR PROSTATE SCREENING SELF PAY EXAM

## 2025-01-20 ENCOUNTER — APPOINTMENT (OUTPATIENT)
Dept: GASTROENTEROLOGY | Facility: EXTERNAL LOCATION | Age: 50
End: 2025-01-20
Payer: COMMERCIAL

## 2025-01-20 ENCOUNTER — ANESTHESIA (OUTPATIENT)
Dept: GASTROENTEROLOGY | Facility: EXTERNAL LOCATION | Age: 50
End: 2025-01-20

## 2025-01-22 NOTE — PROGRESS NOTES
[facility] [place]  Outpatient Clinic Note    Patient Name:  Adryan Perez  MRN:  [id number]  :  1975  Date of Service: 2025     Visit type: Follow up visit    HPI    Interval History:  Adryan Perez is a 49 y.o. male with past medical history of HTN, mixed HLD, prediabetes, who is being seen today for  problems listed below.     Problem list/Chief complaints:  Elevated PSA - 10.28 on 10/12/24 , 11.08 on 1/3/25      1/3/25: NPV. Patient referred to Urology by his primary care for elevated PSA.  Patient is accompanied by his wife. He shares that he drinks a lot of water daily, drinks alcohol on special occasions and only drinks soda sometimes. PVR 46 ml.     Urinary Difficulties? No dysuria, has strong urinary stream, nocturia x 1, no hematuria, no fever or chills.  Unexpected weight loss? No  Bone pain? No  Fatigue? No fatigue  Family history of prostate cancer? No  Previous biopsy? No  Smoker? Former smoker in .    25: Patient presents for follow up and to review MRI scan. He is accompanied by his wife. He reports no changes since his last visit.     Past Medical History:   Diagnosis Date    Congenital hydrocephalus, unspecified 2017    Congenital hydrocephalus    Encounter for general adult medical examination without abnormal findings 2021    Encounter for health maintenance examination    Encounter for general adult medical examination without abnormal findings     Encounter for preventive health examination    Encounter for general adult medical examination without abnormal findings 2018    Encounter for health maintenance examination    Essential (primary) hypertension 2019    HTN (hypertension), benign    Essential (primary) hypertension 2018    HTN (hypertension), benign    Essential (primary) hypertension 2018    HTN (hypertension), benign    Essential (primary) hypertension 2020    HTN (hypertension), benign    Narcolepsy with cataplexy  ([facility])     Cataplexy    Old myocardial infarction     History of heart attack    Personal history of other specified conditions 2019    History of dizziness    Prediabetes 2018    Prediabetes    Prediabetes 2018    Prediabetes    Prediabetes 2020    Prediabetes       Past Surgical History:   Procedure Laterality Date    OTHER SURGICAL HISTORY  2020    Ventriculoperitoneal shunt creation       Social History     Socioeconomic History    Marital status: Single     Spouse name: Not on file    Number of children: Not on file    Years of education: Not on file    Highest education level: Not on file   Occupational History    Not on file   Tobacco Use    Smoking status: Former     Types: Cigars     Quit date: 2006     Years since quittin.1    Smokeless tobacco: Never   Substance and Sexual Activity    Alcohol use: Not on file    Drug use: Not on file    Sexual activity: Not on file   Other Topics Concern    Not on file   Social History Narrative    Not on file     Social Drivers of Health     Financial Resource Strain: Not on file   Food Insecurity: Not on file   Transportation Needs: Not on file   Physical Activity: Not on file   Stress: Not on file   Social Connections: Not on file   Intimate Partner Violence: Not on file   Housing Stability: Not on file       No Known Allergies       Current Outpatient Medications:     amLODIPine (Norvasc) 10 mg tablet, Take 1 tablet (10 mg) by mouth once daily., Disp: 90 tablet, Rfl: 1    atenoloL-chlorthalidone (Tenoretic) 50-25 mg tablet, Take 1 tablet by mouth once daily., Disp: 90 tablet, Rfl: 0    sodium,potassium,mag sulfates (Suprep) 17.5-3.13-1.6 gram recon soln solution, Take one bottle twice as directed by the prep instructions, Disp: 354 mL, Rfl: 0     Review of system:  All other systems have been reviewed and are negative for complaints      Last recorded vitals:  There were no vitals taken for this visit.    Physical  Exam:  General: Appears comfortable and in no apparent distress.  Head: Normocephalic, atraumatic  Eyes: Non-injected conjunctiva, sclera clear, no proptosis  Lungs: Breathing is easy, non-labored. Speaking in clear and complete sentences. Normal diaphragmatic movement.  Cardiovascular: no peripheral edema, cyanosis or pallor.   Abdomen: soft, non-distended, non-tender  : Bladder: non tender, not distended  MSK: Ambulatory with steady gait, unassisted  Skin: No visible rashes or lesions  Neurologic: Alert, oriented to person, place, and time  Psychiatric: mood and affect appropriate      Imaging  MR prostate screening self pay exam  Narrative: Interpreted By:  Winston Waller,   STUDY:  MR PROSTATE SCREENING SELF PAY EXAM;  1/17/2025 5:19 pm      INDICATION:  Signs/Symptoms:Elevated PSA. PSA 11.08 ng/mL, dated 01/03/2025      COMPARISON:  None.      ACCESSION NUMBER(S):  [id number]      ORDERING CLINICIAN:  TRES SANZ      TECHNIQUE:  Multiplanar MRI of the pelvis was obtained focused on the prostate  gland and following a screening protocol including axial, sagittal  and coronal T2 weighted SSFSE, axial T2 FSE and axial DWI. No  intravenous contrast was administered.  3D post-processing was  performed using [employer], on an independent workstation, for the  purpose of enabling fusion with ultrasound, and provided it for  review.      FINDINGS:  PROSTATE VOLUME:  The prostate measures 3.8 x 3.7 x 4.3 cm.  Prostate weight is estimated at 31g. PSA density is 0.35 ng/mL/g.      PROSTATE PARENCHYMA:  There is heterogeneous enlargement of the transition zone, consistent  with benign prostatic hyperplasia. A 1.0 x 0.8 cm ill-defined  fusiform T2 hypointense focal lesion is noted in the right posterior  midgland to base peripheral zone, showing focally restricted  diffusion, consistent with a PI-RADS 4 lesion. An additional  similar-appearing lesion is seen in the left anterior horn of the  midgland  peripheral zone, measuring 0.7 x 0.7 cm, also scored as  PI-RADS 4.      EXTRACAPSULAR EXTENSION:  There is broad abutment of the adjacent prostatic capsule, without  bulging or irregularity to suggest gross extracapsular extension.      SEMINAL VESICLES:  Within normal limits.      PELVIC LYMPH NODES:  No abnormally enlarged pelvic lymph nodes are identified.      PERITONEUM:  No free or loculated fluid collections are evident in the pelvis.      OTHER ORGANS:  Within normal limits.      BONES:  No focal lesions are noted in the bone.      Exam Quality:  Is T2WI weighted imaging of diagnostic quality:  Yes. T2WI  assessment:  Adequate. Is DWI of diagnostic quality:  Yes. DWI  assessment:  Optimal. Is DCE of diagnostic quality:  N/A. DCE  assessment:  N/A. PI-QUAL score:  Two or more sequences independently  are of diagnostic quality Comments:      Impression: 1. Two PI-RADS 4 lesions in the right posterior paramedian midgland  peripheral zone and left anterior forearm of the midglandperipheral  zone. No definite signs of gross extracapsular extension.  2. No evidence of enlarged pelvic lymph nodes.          I personally reviewed the images/study and I agree with the findings  as stated. This study was interpreted at [place], [place].      MACRO:  None      Signed by: Winston Diego 1/19/2025 5:10 PM  Dictation workstation:   [id number]      Labs  Lab on 01/03/2025   Component Date Value    Prostate Specific AG 01/03/2025 11.08 (H)     4K - Biopsy History 01/03/2025 NO BIOPSY     4K - Digital Rectal Exam* 01/03/2025 NO NODULE     4K - Clinical Indication* 01/03/2025 45-53YO+PSA>=2     4K - Score 01/03/2025 66.5     4K - PSA, Total 01/03/2025 13.01 (H)     4K - PSA, Free 01/03/2025 0.84     4K - PSA, Percent Free 01/03/2025 6      Lab Results   Component Value Date    PSA 11.08 (H) 01/03/2025    PSA 4.35 (H) 02/19/2021    PSA 3.98 02/17/2020          Assessment and Plan:  Adryan Perez is a 49 y.o. male with history of elevated PSA, who presents for follow up after MRI prostate.     1.We discussed PSA as a tool to screen gentleman for prostate cancer. We discussed that many factors can elevate the PSA, and when the PSA is elevated further testing is warranted.     I discussed with the patient that the prostate MRI has a high sensitivity for high-grade prostate cancer lesions but a lower sensitivity for low to intermediate prostate cancer lesions. We discussed that currently MRI is not considered to be a standard of care for prostate cancer screening, therefore self-pay option is available.    PI-RADs score:  PI-RADs 1-2, BPH, continue to monitor PSA. PI-RADs 3, 50/50 chance of developing into CA, monitor PSA and send to MD for biopsy if patient wants. PI-RADs 4, 70% chance it could be CA, send to MD for biopsy. PI-RADs 5, 90% chance it could be CA, send to MD for biopsy.    Plan:  -Reviewed MRI results with patient and his wife  -Referral to Urology MD to discuss prostate biopsy placed  -Follow-up as scheduled, or sooner if needed, to reassess symptoms and for medication refill.    All questions and concerns were addressed. Patient verbalizes understanding and has no other questions at this time.     Some elements copied from my note on 1/3/25, the elements have been updated and all reflect current decision making from today, 01/24/25    E&M visit today is associated with current or anticipated ongoing medical care services related to a patient's single, serious condition or a complex condition.    ADE Gurrola-CNP   [facility] [place]  01/24/25 4:28 PM

## 2025-01-24 ENCOUNTER — OFFICE VISIT (OUTPATIENT)
Dept: UROLOGY | Facility: HOSPITAL | Age: 50
End: 2025-01-24
Payer: COMMERCIAL

## 2025-01-24 DIAGNOSIS — R97.20 ELEVATED PSA: Primary | ICD-10-CM

## 2025-01-24 PROBLEM — E78.5 HYPERLIPIDEMIA: Status: ACTIVE | Noted: 2025-01-24

## 2025-01-24 PROBLEM — I10 BENIGN ESSENTIAL HYPERTENSION: Status: ACTIVE | Noted: 2025-01-24

## 2025-01-24 PROBLEM — E55.9 VITAMIN D DEFICIENCY: Status: ACTIVE | Noted: 2025-01-24

## 2025-01-24 PROCEDURE — 1036F TOBACCO NON-USER: CPT | Performed by: NURSE PRACTITIONER

## 2025-01-24 PROCEDURE — 99213 OFFICE O/P EST LOW 20 MIN: CPT | Performed by: NURSE PRACTITIONER

## 2025-01-24 PROCEDURE — G2211 COMPLEX E/M VISIT ADD ON: HCPCS | Performed by: NURSE PRACTITIONER

## 2025-01-24 ASSESSMENT — PAIN SCALES - GENERAL: PAINLEVEL_OUTOF10: 0-NO PAIN

## 2025-01-31 ENCOUNTER — APPOINTMENT (OUTPATIENT)
Dept: GASTROENTEROLOGY | Facility: EXTERNAL LOCATION | Age: 50
End: 2025-01-31
Payer: COMMERCIAL

## 2025-02-04 NOTE — PROGRESS NOTES
Subjective   Patient ID: Adryan Perez is a 49 y.o. male    HPI  49 y.o. male who presents with his wife for a prostate biopsy. He has been following NP Jessika Levi for elevated PSA. He denies any dysuria, hematuria, fever or chills. He reports a strong urinary stream, nocturia x 1. PSA trend is 10.28 on 10/12/24 , 11.08 on 1/3/25. MR prostate on 01/17/2025 revealed Two PI-RADS 4 lesions in the right posterior paramedian midgland peripheral zone and left anterior forearm of the midglandperipheral zone.     The most recent MR prostate, conducted on 01/17/2025, revealed:  1. Two PI-RADS 4 lesions in the right posterior paramedian midgland  peripheral zone and left anterior forearm of the midglandperipheral  zone. No definite signs of gross extracapsular extension.  2. No evidence of enlarged pelvic lymph nodes.       Review of Systems    All systems were reviewed. Anything negative was noted in the HPI.    Objective   Physical Exam    General: Well developed, well nourished, alert and cooperative, appears in no acute distress   Eyes: Non-injected conjunctiva, sclera clear, no proptosis   Cardiac: Extremities are warm and well perfused. No edema, cyanosis or pallor   Lungs: Breathing is easy, non-labored. Speaking in clear and complete sentences. Normal diaphragmatic movement   MSK: Ambulatory with steady gait, unassisted   Neuro: Alert and oriented to person, place, and time   Psych: Demonstrates good judgment and reason, without hallucinations, abnormal affect or abnormal behaviors   Skin: No obvious lesions, no rashes       No CVA tenderness bilaterally   No suprapubic pain or discomfort       Past Medical History:   Diagnosis Date    Congenital hydrocephalus, unspecified 01/12/2017    Congenital hydrocephalus    Encounter for general adult medical examination without abnormal findings 02/19/2021    Encounter for health maintenance examination    Encounter for general adult medical examination without abnormal  findings     Encounter for preventive health examination    Encounter for general adult medical examination without abnormal findings 01/12/2018    Encounter for health maintenance examination    Essential (primary) hypertension 01/31/2019    HTN (hypertension), benign    Essential (primary) hypertension 07/17/2018    HTN (hypertension), benign    Essential (primary) hypertension 09/17/2018    HTN (hypertension), benign    Essential (primary) hypertension 01/20/2020    HTN (hypertension), benign    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[facility])     Cataplexy    Old myocardial infarction     History of heart attack    Personal history of other specified conditions 05/13/2019    History of dizziness    Prediabetes 01/12/2018    Prediabetes    Prediabetes 07/17/2018    Prediabetes    Prediabetes 01/20/2020    Prediabetes         Past Surgical History:   Procedure Laterality Date    OTHER SURGICAL HISTORY  02/17/2020    Ventriculoperitoneal shunt creation         Assessment/Plan   Elevated PSA, PIRADs 4 lesion on MRI    49 y.o. male who presents for the above condition,  We had a very long and extensive discussion with the patient regarding elevated PSA. I explained to him the pathophysiology, differential diagnosis, risk factor, associated conditions, and management. I explained to him that elevated PSA could be either due to BPH, prostate infection or inflammation or prostate adenocarcinoma. We discussed the need to do a work-up to rule out any underlying prostate adenocarcinoma in the form of MR fusion biopsy of the prostate. We discussed at length the risk, benefit, potential complication, adverse events of prostate biopsy including pain, discomfort, urinary retention, hematuria, pneumaturia, hematospermia. Explained to him that there is a 2% to 5% risk of complicated urinary infection and urosepsis. Explained to him indicates it happens, he will need to be admitted for IV antibiotic for 2 to 3 days at the hospital.       Plan:  - Referred to Dr. Quinteros for TP biopsy      E&M visit today is associated with current or anticipated ongoing medical care services related to a patient's single, serious condition or a complex condition.     2/5/2025    Scribe Attestation  By signing my name below, Reinaldo PHILLIPS Scribe attest that this documentation has been prepared under the direction and in the presence of Dr. Sabas Camargo.

## 2025-02-05 ENCOUNTER — PROCEDURE VISIT (OUTPATIENT)
Dept: UROLOGY | Facility: HOSPITAL | Age: 50
End: 2025-02-05
Payer: COMMERCIAL

## 2025-02-05 DIAGNOSIS — R97.20 ELEVATED PSA: Primary | ICD-10-CM

## 2025-02-05 PROCEDURE — 99213 OFFICE O/P EST LOW 20 MIN: CPT | Performed by: STUDENT IN AN ORGANIZED HEALTH CARE EDUCATION/TRAINING PROGRAM

## 2025-02-13 ENCOUNTER — PREP FOR PROCEDURE (OUTPATIENT)
Dept: UROLOGY | Facility: CLINIC | Age: 50
End: 2025-02-13
Payer: COMMERCIAL

## 2025-02-13 DIAGNOSIS — C61 MALIGNANT NEOPLASM OF PROSTATE (MULTI): Primary | ICD-10-CM

## 2025-02-19 DIAGNOSIS — R97.20 ELEVATED PSA: ICD-10-CM

## 2025-02-19 DIAGNOSIS — Z12.5 PROSTATE CANCER SCREENING: ICD-10-CM

## 2025-02-19 PROBLEM — C61 MALIGNANT NEOPLASM OF PROSTATE (MULTI): Status: ACTIVE | Noted: 2025-02-13

## 2025-02-21 PROBLEM — Z12.5 PROSTATE CANCER SCREENING: Status: ACTIVE | Noted: 2025-02-19

## 2025-03-25 ENCOUNTER — TELEPHONE (OUTPATIENT)
Dept: PREADMISSION TESTING | Facility: HOSPITAL | Age: 50
End: 2025-03-25
Payer: COMMERCIAL

## 2025-04-02 ENCOUNTER — APPOINTMENT (OUTPATIENT)
Dept: PREADMISSION TESTING | Facility: HOSPITAL | Age: 50
End: 2025-04-02
Payer: COMMERCIAL

## 2025-04-03 ENCOUNTER — APPOINTMENT (OUTPATIENT)
Dept: UROLOGY | Facility: CLINIC | Age: 50
End: 2025-04-03
Payer: COMMERCIAL

## 2025-04-04 ENCOUNTER — TELEPHONE (OUTPATIENT)
Dept: PREADMISSION TESTING | Facility: HOSPITAL | Age: 50
End: 2025-04-04

## 2025-04-04 ENCOUNTER — TELEMEDICINE (OUTPATIENT)
Dept: UROLOGY | Facility: HOSPITAL | Age: 50
End: 2025-04-04
Payer: COMMERCIAL

## 2025-04-04 DIAGNOSIS — R97.20 ELEVATED PSA: Primary | ICD-10-CM

## 2025-04-04 PROCEDURE — 99214 OFFICE O/P EST MOD 30 MIN: CPT | Performed by: STUDENT IN AN ORGANIZED HEALTH CARE EDUCATION/TRAINING PROGRAM

## 2025-04-04 NOTE — H&P (VIEW-ONLY)
UROLOGIC INITIAL EVALUATION     PROBLEM LIST:  1. Elevated PSA          HISTORY OF PRESENT ILLNESS:   Adryan Perez is a 49 y.o. male with past medical history of HTN, mixed HLD, prediabetes and elevated PSA. He presents for his initial consultation today via telehealth visit. Has previously seen JAYSHREE Gurrola. Recent PSA on 1/3/25 11.08 and 10.28 on 10/12/24. He is scheduled for TURP on 4/11/25.    Today, he reports feeling overall well. Reports stable urinary symptoms. He denies any fevers, chills, nausea, vomiting.       PAST MEDICAL HISTORY:  Past Medical History:   Diagnosis Date    Congenital hydrocephalus, unspecified 01/12/2017    Congenital hydrocephalus    Encounter for general adult medical examination without abnormal findings 02/19/2021    Encounter for health maintenance examination    Encounter for general adult medical examination without abnormal findings     Encounter for preventive health examination    Encounter for general adult medical examination without abnormal findings 01/12/2018    Encounter for health maintenance examination    Essential (primary) hypertension 01/31/2019    HTN (hypertension), benign    Essential (primary) hypertension 07/17/2018    HTN (hypertension), benign    Essential (primary) hypertension 09/17/2018    HTN (hypertension), benign    Essential (primary) hypertension 01/20/2020    HTN (hypertension), benign    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[facility])     Cataplexy    Old myocardial infarction     History of heart attack    Personal history of other specified conditions 05/13/2019    History of dizziness    Prediabetes 01/12/2018    Prediabetes    Prediabetes 07/17/2018    Prediabetes    Prediabetes 01/20/2020    Prediabetes       PAST SURGICAL HISTORY:  Past Surgical History:   Procedure Laterality Date    OTHER SURGICAL HISTORY  02/17/2020    Ventriculoperitoneal shunt creation        ALLERGIES:   No Known Allergies     MEDICATIONS:     Current Outpatient  Medications:     amLODIPine (Norvasc) 10 mg tablet, Take 1 tablet (10 mg) by mouth once daily., Disp: 90 tablet, Rfl: 1    atenoloL-chlorthalidone (Tenoretic) 50-25 mg tablet, Take 1 tablet by mouth once daily., Disp: 90 tablet, Rfl: 0    sodium,potassium,mag sulfates (Suprep) 17.5-3.13-1.6 gram recon soln solution, Take one bottle twice as directed by the prep instructions, Disp: 354 mL, Rfl: 0      SOCIAL HISTORY:  Patient  reports that he quit smoking about 18 years ago. His smoking use included cigars. He has never used smokeless tobacco.   Social History     Socioeconomic History    Marital status: Single     Spouse name: Not on file    Number of children: Not on file    Years of education: Not on file    Highest education level: Not on file   Occupational History    Not on file   Tobacco Use    Smoking status: Former     Types: Cigars     Quit date: 2006     Years since quittin.3    Smokeless tobacco: Never   Substance and Sexual Activity    Alcohol use: Not on file    Drug use: Not on file    Sexual activity: Not on file   Other Topics Concern    Not on file   Social History Narrative    Not on file     Social Drivers of Health     Financial Resource Strain: Not on file   Food Insecurity: Not on file   Transportation Needs: Not on file   Physical Activity: Not on file   Stress: Not on file   Social Connections: Not on file   Intimate Partner Violence: Not on file   Housing Stability: Not on file       FAMILY HISTORY:  Family History   Problem Relation Name Age of Onset    Hypertension Mother      Hypertension Father      Hypertension Brother      Hypertension Brother      No Known Problems Daughter      No Known Problems Son      No Known Problems Mother's Sister      No Known Problems Mother's Brother      No Known Problems Father's Sister      No Known Problems Father's Brother      No Known Problems Maternal Grandmother      Hypertension Maternal Grandfather      Hypertension Paternal Grandmother       Hypertension Paternal Grandfather         REVIEW OF SYSTEMS:  Constitutional: Negative for fever and chills. Denies anorexia, weight loss.  Eyes: Negative for visual disturbance.   Respiratory: Negative for shortness of breath.    Cardiovascular: Negative for chest pain.   Gastrointestinal: Negative for nausea and vomiting.   Genitourinary: See interval history above.  Skin: Negative for rash.   Neurological: Negative for dizziness and numbness.   Psychiatric/Behavioral: Negative for confusion and decreased concentration.     PHYSICAL EXAM:  There were no vitals taken for this visit.  Physical exam limited due to telehealth visit    RADIOLOGY REVIEW:  MRI prostate 1/17/25  FINDINGS:  PROSTATE VOLUME:  The prostate measures 3.8 x 3.7 x 4.3 cm.  Prostate weight is estimated at 31g. PSA density is 0.35 ng/mL/g.      PROSTATE PARENCHYMA:  There is heterogeneous enlargement of the transition zone, consistent  with benign prostatic hyperplasia. A 1.0 x 0.8 cm ill-defined  fusiform T2 hypointense focal lesion is noted in the right posterior  midgland to base peripheral zone, showing focally restricted  diffusion, consistent with a PI-RADS 4 lesion. An additional  similar-appearing lesion is seen in the left anterior horn of the  midgland peripheral zone, measuring 0.7 x 0.7 cm, also scored as  PI-RADS 4.      EXTRACAPSULAR EXTENSION:  There is broad abutment of the adjacent prostatic capsule, without  bulging or irregularity to suggest gross extracapsular extension.      SEMINAL VESICLES:  Within normal limits.      PELVIC LYMPH NODES:  No abnormally enlarged pelvic lymph nodes are identified.      PERITONEUM:  No free or loculated fluid collections are evident in the pelvis.      OTHER ORGANS:  Within normal limits.      BONES:  No focal lesions are noted in the bone.      Exam Quality:  Is T2WI weighted imaging of diagnostic quality:  Yes. T2WI  assessment:  Adequate. Is DWI of diagnostic quality:  Yes.  DWI  assessment:  Optimal. Is DCE of diagnostic quality:  N/A. DCE  assessment:  N/A. PI-QUAL score:  Two or more sequences independently  are of diagnostic quality Comments:      IMPRESSION:  1. Two PI-RADS 4 lesions in the right posterior paramedian midgland  peripheral zone and left anterior forearm of the midglandperipheral  zone. No definite signs of gross extracapsular extension.  2. No evidence of enlarged pelvic lymph nodes.          I personally reviewed the images/study and I agree with the findings  as stated. This study was interpreted at [place], [place].      MACRO:  None      Signed by: Winston Diego 1/19/2025 5:10 PM  Dictation workstation:   [id number]    LABORATORY REVIEW:     Lab Results   Component Value Date    BUN 19 10/12/2024    CREATININE 1.14 10/12/2024    EGFR 78 10/12/2024     (L) 10/12/2024    K 3.8 10/12/2024    CL 94 (L) 10/12/2024    CO2 31 10/12/2024    CALCIUM 9.3 10/12/2024      Lab Results   Component Value Date    WBC 6.4 10/12/2024    RBC 5.03 10/12/2024    HGB 15.4 10/12/2024    HCT 46.2 10/12/2024    MCV 92 10/12/2024    MCH 30.6 10/12/2024    MCHC 33.3 10/12/2024    RDW 12.3 10/12/2024     10/12/2024        Lab Results   Component Value Date    PSA 11.08 (H) 01/03/2025    PSA 4.35 (H) 02/19/2021    PSA 3.98 02/17/2020    PSA 1.83 01/12/2018             Assessment:    No diagnosis found.       Plan:   We dicussed his presentation in detail.   Reviewed and interpreted patient's PSA trend  Discussed with patient the different etiologies of an elevated PSA   Reviewed and interpreted patient's prostate MRI which revealed Two PI-RADS 4 lesions.   We reviewed the rationale for pMRI and the ability to detect clinically significant prostate cancer    Counseled patient on the association between a PI-RADS 4 lesion and clinically significant prostate cancer  Discussed with patient that I would recommend  proceeding with a fusion biopsy of the prostate  We reviewed how we perform transperineal fusion biopsies of the prostate as well as the anticipated recovery, all questions were answered  Patient agreeable.   TP fusion prostate biopsy as scheduled on 4/11/25.    31 minutes total spent on patient's care today; >50% time spent on counseling/coordination of care    Scribe Attestation  By signing my name below, Rhona PHILLIPS , [place]   attest that this documentation has been prepared under the direction and in the presence of Ricky Quinteros MD.

## 2025-04-04 NOTE — PROGRESS NOTES
UROLOGIC INITIAL EVALUATION     PROBLEM LIST:  1. Elevated PSA          HISTORY OF PRESENT ILLNESS:   Adryan Perez is a 49 y.o. male with past medical history of HTN, mixed HLD, prediabetes and elevated PSA. He presents for his initial consultation today via telehealth visit. Has previously seen JAYSHREE Gurrola. Recent PSA on 1/3/25 11.08 and 10.28 on 10/12/24. He is scheduled for TURP on 4/11/25.    Today, he reports feeling overall well. Reports stable urinary symptoms. He denies any fevers, chills, nausea, vomiting.       PAST MEDICAL HISTORY:  Past Medical History:   Diagnosis Date    Congenital hydrocephalus, unspecified 01/12/2017    Congenital hydrocephalus    Encounter for general adult medical examination without abnormal findings 02/19/2021    Encounter for health maintenance examination    Encounter for general adult medical examination without abnormal findings     Encounter for preventive health examination    Encounter for general adult medical examination without abnormal findings 01/12/2018    Encounter for health maintenance examination    Essential (primary) hypertension 01/31/2019    HTN (hypertension), benign    Essential (primary) hypertension 07/17/2018    HTN (hypertension), benign    Essential (primary) hypertension 09/17/2018    HTN (hypertension), benign    Essential (primary) hypertension 01/20/2020    HTN (hypertension), benign    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[facility])     Cataplexy    Old myocardial infarction     History of heart attack    Personal history of other specified conditions 05/13/2019    History of dizziness    Prediabetes 01/12/2018    Prediabetes    Prediabetes 07/17/2018    Prediabetes    Prediabetes 01/20/2020    Prediabetes       PAST SURGICAL HISTORY:  Past Surgical History:   Procedure Laterality Date    OTHER SURGICAL HISTORY  02/17/2020    Ventriculoperitoneal shunt creation        ALLERGIES:   No Known Allergies     MEDICATIONS:     Current Outpatient  Medications:     amLODIPine (Norvasc) 10 mg tablet, Take 1 tablet (10 mg) by mouth once daily., Disp: 90 tablet, Rfl: 1    atenoloL-chlorthalidone (Tenoretic) 50-25 mg tablet, Take 1 tablet by mouth once daily., Disp: 90 tablet, Rfl: 0    sodium,potassium,mag sulfates (Suprep) 17.5-3.13-1.6 gram recon soln solution, Take one bottle twice as directed by the prep instructions, Disp: 354 mL, Rfl: 0      SOCIAL HISTORY:  Patient  reports that he quit smoking about 18 years ago. His smoking use included cigars. He has never used smokeless tobacco.   Social History     Socioeconomic History    Marital status: Single     Spouse name: Not on file    Number of children: Not on file    Years of education: Not on file    Highest education level: Not on file   Occupational History    Not on file   Tobacco Use    Smoking status: Former     Types: Cigars     Quit date: 2006     Years since quittin.3    Smokeless tobacco: Never   Substance and Sexual Activity    Alcohol use: Not on file    Drug use: Not on file    Sexual activity: Not on file   Other Topics Concern    Not on file   Social History Narrative    Not on file     Social Drivers of Health     Financial Resource Strain: Not on file   Food Insecurity: Not on file   Transportation Needs: Not on file   Physical Activity: Not on file   Stress: Not on file   Social Connections: Not on file   Intimate Partner Violence: Not on file   Housing Stability: Not on file       FAMILY HISTORY:  Family History   Problem Relation Name Age of Onset    Hypertension Mother      Hypertension Father      Hypertension Brother      Hypertension Brother      No Known Problems Daughter      No Known Problems Son      No Known Problems Mother's Sister      No Known Problems Mother's Brother      No Known Problems Father's Sister      No Known Problems Father's Brother      No Known Problems Maternal Grandmother      Hypertension Maternal Grandfather      Hypertension Paternal Grandmother       Hypertension Paternal Grandfather         REVIEW OF SYSTEMS:  Constitutional: Negative for fever and chills. Denies anorexia, weight loss.  Eyes: Negative for visual disturbance.   Respiratory: Negative for shortness of breath.    Cardiovascular: Negative for chest pain.   Gastrointestinal: Negative for nausea and vomiting.   Genitourinary: See interval history above.  Skin: Negative for rash.   Neurological: Negative for dizziness and numbness.   Psychiatric/Behavioral: Negative for confusion and decreased concentration.     PHYSICAL EXAM:  There were no vitals taken for this visit.  Physical exam limited due to telehealth visit    RADIOLOGY REVIEW:  MRI prostate 1/17/25  FINDINGS:  PROSTATE VOLUME:  The prostate measures 3.8 x 3.7 x 4.3 cm.  Prostate weight is estimated at 31g. PSA density is 0.35 ng/mL/g.      PROSTATE PARENCHYMA:  There is heterogeneous enlargement of the transition zone, consistent  with benign prostatic hyperplasia. A 1.0 x 0.8 cm ill-defined  fusiform T2 hypointense focal lesion is noted in the right posterior  midgland to base peripheral zone, showing focally restricted  diffusion, consistent with a PI-RADS 4 lesion. An additional  similar-appearing lesion is seen in the left anterior horn of the  midgland peripheral zone, measuring 0.7 x 0.7 cm, also scored as  PI-RADS 4.      EXTRACAPSULAR EXTENSION:  There is broad abutment of the adjacent prostatic capsule, without  bulging or irregularity to suggest gross extracapsular extension.      SEMINAL VESICLES:  Within normal limits.      PELVIC LYMPH NODES:  No abnormally enlarged pelvic lymph nodes are identified.      PERITONEUM:  No free or loculated fluid collections are evident in the pelvis.      OTHER ORGANS:  Within normal limits.      BONES:  No focal lesions are noted in the bone.      Exam Quality:  Is T2WI weighted imaging of diagnostic quality:  Yes. T2WI  assessment:  Adequate. Is DWI of diagnostic quality:  Yes.  DWI  assessment:  Optimal. Is DCE of diagnostic quality:  N/A. DCE  assessment:  N/A. PI-QUAL score:  Two or more sequences independently  are of diagnostic quality Comments:      IMPRESSION:  1. Two PI-RADS 4 lesions in the right posterior paramedian midgland  peripheral zone and left anterior forearm of the midglandperipheral  zone. No definite signs of gross extracapsular extension.  2. No evidence of enlarged pelvic lymph nodes.          I personally reviewed the images/study and I agree with the findings  as stated. This study was interpreted at [place], [place].      MACRO:  None      Signed by: Winston Diego 1/19/2025 5:10 PM  Dictation workstation:   [id number]    LABORATORY REVIEW:     Lab Results   Component Value Date    BUN 19 10/12/2024    CREATININE 1.14 10/12/2024    EGFR 78 10/12/2024     (L) 10/12/2024    K 3.8 10/12/2024    CL 94 (L) 10/12/2024    CO2 31 10/12/2024    CALCIUM 9.3 10/12/2024      Lab Results   Component Value Date    WBC 6.4 10/12/2024    RBC 5.03 10/12/2024    HGB 15.4 10/12/2024    HCT 46.2 10/12/2024    MCV 92 10/12/2024    MCH 30.6 10/12/2024    MCHC 33.3 10/12/2024    RDW 12.3 10/12/2024     10/12/2024        Lab Results   Component Value Date    PSA 11.08 (H) 01/03/2025    PSA 4.35 (H) 02/19/2021    PSA 3.98 02/17/2020    PSA 1.83 01/12/2018             Assessment:    No diagnosis found.       Plan:   We dicussed his presentation in detail.   Reviewed and interpreted patient's PSA trend  Discussed with patient the different etiologies of an elevated PSA   Reviewed and interpreted patient's prostate MRI which revealed Two PI-RADS 4 lesions.   We reviewed the rationale for pMRI and the ability to detect clinically significant prostate cancer    Counseled patient on the association between a PI-RADS 4 lesion and clinically significant prostate cancer  Discussed with patient that I would recommend  proceeding with a fusion biopsy of the prostate  We reviewed how we perform transperineal fusion biopsies of the prostate as well as the anticipated recovery, all questions were answered  Patient agreeable.   TP fusion prostate biopsy as scheduled on 4/11/25.    31 minutes total spent on patient's care today; >50% time spent on counseling/coordination of care    Scribe Attestation  By signing my name below, Rhona PHILLIPS , [place]   attest that this documentation has been prepared under the direction and in the presence of Ricky Quinteros MD.

## 2025-04-08 ENCOUNTER — LAB (OUTPATIENT)
Dept: LAB | Facility: HOSPITAL | Age: 50
End: 2025-04-08
Payer: COMMERCIAL

## 2025-04-08 ENCOUNTER — PRE-ADMISSION TESTING (OUTPATIENT)
Dept: PREADMISSION TESTING | Facility: HOSPITAL | Age: 50
End: 2025-04-08
Payer: COMMERCIAL

## 2025-04-08 VITALS
WEIGHT: 260.14 LBS | RESPIRATION RATE: 16 BRPM | TEMPERATURE: 97.7 F | SYSTOLIC BLOOD PRESSURE: 153 MMHG | DIASTOLIC BLOOD PRESSURE: 90 MMHG | BODY MASS INDEX: 37.24 KG/M2 | HEIGHT: 70 IN | OXYGEN SATURATION: 98 % | HEART RATE: 74 BPM

## 2025-04-08 DIAGNOSIS — R30.0 DYSURIA: ICD-10-CM

## 2025-04-08 DIAGNOSIS — I10 BENIGN ESSENTIAL HYPERTENSION: Primary | ICD-10-CM

## 2025-04-08 DIAGNOSIS — I10 ESSENTIAL (PRIMARY) HYPERTENSION: Primary | ICD-10-CM

## 2025-04-08 LAB
ANION GAP SERPL CALC-SCNC: 10 MMOL/L (ref 10–20)
APPEARANCE UR: CLEAR
ATRIAL RATE: 92 BPM
BASOPHILS # BLD AUTO: 0.09 X10*3/UL (ref 0–0.1)
BASOPHILS NFR BLD AUTO: 1.6 %
BILIRUB UR STRIP.AUTO-MCNC: NEGATIVE MG/DL
BUN SERPL-MCNC: 16 MG/DL (ref 6–23)
CALCIUM SERPL-MCNC: 9.8 MG/DL (ref 8.6–10.3)
CHLORIDE SERPL-SCNC: 104 MMOL/L (ref 98–107)
CO2 SERPL-SCNC: 29 MMOL/L (ref 21–32)
COLOR UR: NORMAL
CREAT SERPL-MCNC: 1.11 MG/DL (ref 0.5–1.3)
EGFRCR SERPLBLD CKD-EPI 2021: 80 ML/MIN/1.73M*2
EOSINOPHIL # BLD AUTO: 0.2 X10*3/UL (ref 0–0.7)
EOSINOPHIL NFR BLD AUTO: 3.5 %
ERYTHROCYTE [DISTWIDTH] IN BLOOD BY AUTOMATED COUNT: 12.7 % (ref 11.5–14.5)
GLUCOSE SERPL-MCNC: 99 MG/DL (ref 74–99)
GLUCOSE UR STRIP.AUTO-MCNC: NORMAL MG/DL
HCT VFR BLD AUTO: 48.8 % (ref 41–52)
HGB BLD-MCNC: 16 G/DL (ref 13.5–17.5)
HOLD SPECIMEN: NORMAL
IMM GRANULOCYTES # BLD AUTO: 0.01 X10*3/UL (ref 0–0.7)
IMM GRANULOCYTES NFR BLD AUTO: 0.2 % (ref 0–0.9)
KETONES UR STRIP.AUTO-MCNC: NEGATIVE MG/DL
LEUKOCYTE ESTERASE UR QL STRIP.AUTO: NEGATIVE
LYMPHOCYTES # BLD AUTO: 2.01 X10*3/UL (ref 1.2–4.8)
LYMPHOCYTES NFR BLD AUTO: 35.4 %
MCH RBC QN AUTO: 30.5 PG (ref 26–34)
MCHC RBC AUTO-ENTMCNC: 32.8 G/DL (ref 32–36)
MCV RBC AUTO: 93 FL (ref 80–100)
MONOCYTES # BLD AUTO: 0.77 X10*3/UL (ref 0.1–1)
MONOCYTES NFR BLD AUTO: 13.6 %
NEUTROPHILS # BLD AUTO: 2.59 X10*3/UL (ref 1.2–7.7)
NEUTROPHILS NFR BLD AUTO: 45.7 %
NITRITE UR QL STRIP.AUTO: NEGATIVE
NRBC BLD-RTO: 0 /100 WBCS (ref 0–0)
P AXIS: 54 DEGREES
P OFFSET: 196 MS
P ONSET: 151 MS
PH UR STRIP.AUTO: 5 [PH]
PLATELET # BLD AUTO: 257 X10*3/UL (ref 150–450)
POTASSIUM SERPL-SCNC: 4.8 MMOL/L (ref 3.5–5.3)
PR INTERVAL: 138 MS
PROT UR STRIP.AUTO-MCNC: NEGATIVE MG/DL
Q ONSET: 220 MS
QRS COUNT: 15 BEATS
QRS DURATION: 78 MS
QT INTERVAL: 358 MS
QTC CALCULATION(BAZETT): 442 MS
QTC FREDERICIA: 412 MS
R AXIS: 47 DEGREES
RBC # BLD AUTO: 5.25 X10*6/UL (ref 4.5–5.9)
RBC # UR STRIP.AUTO: NEGATIVE MG/DL
SODIUM SERPL-SCNC: 138 MMOL/L (ref 136–145)
SP GR UR STRIP.AUTO: 1.03
T AXIS: 54 DEGREES
T OFFSET: 399 MS
UROBILINOGEN UR STRIP.AUTO-MCNC: NORMAL MG/DL
VENTRICULAR RATE: 92 BPM
WBC # BLD AUTO: 5.7 X10*3/UL (ref 4.4–11.3)

## 2025-04-08 PROCEDURE — 93005 ELECTROCARDIOGRAM TRACING: CPT | Performed by: NURSE PRACTITIONER

## 2025-04-08 PROCEDURE — 81003 URINALYSIS AUTO W/O SCOPE: CPT

## 2025-04-08 PROCEDURE — 99204 OFFICE O/P NEW MOD 45 MIN: CPT | Performed by: NURSE PRACTITIONER

## 2025-04-08 PROCEDURE — 80048 BASIC METABOLIC PNL TOTAL CA: CPT

## 2025-04-08 PROCEDURE — 93010 ELECTROCARDIOGRAM REPORT: CPT | Performed by: INTERNAL MEDICINE

## 2025-04-08 PROCEDURE — 85025 COMPLETE CBC W/AUTO DIFF WBC: CPT

## 2025-04-08 RX ORDER — MULTIVIT-MIN/IRON FUM/FOLIC AC 7.5 MG-4
1 TABLET ORAL DAILY
COMMUNITY

## 2025-04-08 ASSESSMENT — ENCOUNTER SYMPTOMS
CARDIOVASCULAR NEGATIVE: 1
RESPIRATORY NEGATIVE: 1
MUSCULOSKELETAL NEGATIVE: 1
NEUROLOGICAL NEGATIVE: 1
NECK NEGATIVE: 1
CONSTITUTIONAL NEGATIVE: 1
GASTROINTESTINAL NEGATIVE: 1
ENDOCRINE NEGATIVE: 1

## 2025-04-08 NOTE — CPM/PAT H&P
[facility] Evaluation       Name: Adryan Perez (Adryan Perez)  /Age: 1975/49 y.o.     In-Person         Date of Consult: 25    Referring Provider:  Dr. Quinteros    Date, Surgery, and Length:  25, transperineal prostate biopsy, 40 minutes    Patient presents to [facility] for perioperative risk assessment prior to scheduled surgery. Patient presents with elevated PSA. MRI shows PI-RADS 4 lesion. He will proceed with biopsy.        This note was created in part upon personal review of patient's medical records.        Pt denies any past history of anesthetic complications such as PONV, awareness, prolonged sedation, dental damage, aspiration, cardiac arrest, difficult intubation, difficult I.V. access or unexpected hospital admissions. No history of malignant hyperthermia and or pseudocholinesterase deficiency.    No history of blood transfusions.    The patient IS NOT a Worship and will accept blood and blood products if medically indicated.     Type and screen NOT sent.      Past Medical History:   Diagnosis Date    BPPV (benign paroxysmal positional vertigo)     Congenital hydrocephalus, unspecified 2017    Congenital hydrocephalus    Elevated PSA     Essential (primary) hypertension 2020    HTN (hypertension), benign    HLD (hyperlipidemia)     10/12/24 Chol 209, , Triglycerides 105    Old myocardial infarction     History of heart attack- , no interventions, no issues since then, no longer follows with cardiology    Prediabetes     10/12/24 A1c 6.2       Past Surgical History:   Procedure Laterality Date    OTHER SURGICAL HISTORY  2020    Ventriculoperitoneal shunt creation       Family History   Problem Relation Name Age of Onset    Hypertension Mother      Heart attack Mother      Stroke Mother      Hypertension Father      Heart attack Father      Hypertension Brother      Hypertension Brother      Stroke Brother      No Known Problems Maternal Grandmother    "   Hypertension Maternal Grandfather      Hypertension Paternal Grandmother      Hypertension Paternal Grandfather      No Known Problems Daughter      No Known Problems Son      No Known Problems Mother's Sister      No Known Problems Mother's Brother      No Known Problems Father's Sister      No Known Problems Father's Brother       Social History     Tobacco Use   Smoking Status Former    Types: Cigars    Quit date: 2006    Years since quittin.3   Smokeless Tobacco Never     Social History     Substance and Sexual Activity   Alcohol Use Yes    Alcohol/week: 1.0 standard drink of alcohol    Types: 1 Cans of beer per week     Social History     Substance and Sexual Activity   Drug Use Never       No Known Allergies    Current Outpatient Medications   Medication Instructions    amLODIPine (NORVASC) 10 mg, oral, Daily    atenoloL-chlorthalidone (Tenoretic) 50-25 mg tablet 1 tablet, oral, Daily    multivitamin with minerals tablet 1 tablet, Daily    sodium,potassium,mag sulfates (Suprep) 17.5-3.13-1.6 gram recon soln solution Take one bottle twice as directed by the prep instructions          PAT ROS:   Constitutional:   neg    Neuro/Psych:   neg    Eyes:    use of corrective lenses  Ears:   neg    Nose:   neg    Mouth:    Partial upper  Throat:   neg    Neck:   neg    Cardio:   neg    Respiratory:   neg    Endocrine:   neg    GI:   neg    :   neg    Musculoskeletal:   neg    Hematologic:   neg    Skin:  neg        Physical Exam  Vitals reviewed. Physical exam within normal limits.          PAT AIRWAY:   Airway:     Mallampati::  III    TM distance::  >3 FB    Neck ROM::  Full   upper dentures        Visit Vitals  /90   Pulse 74   Temp 36.5 °C (97.7 °F)   Resp 16   Ht 1.77 m (5' 9.69\")   Wt 118 kg (260 lb 2.3 oz)   SpO2 98%   BMI 37.66 kg/m²   Smoking Status Former   BSA 2.41 m²       Assessment and Plan:     Patient is a 49 year old male scheduled for transperineal prostate biopsy on 25 with " [place].    Patient is at acceptable risk to proceed with planned surgical procedure. Further cardiac risk stratification deferred at this time.This patient is LOW risk candidate undergoing LOW risk procedure, patient is medically optimized for surgery.    Plan      Neuro:    Normal pressure hydrocephalus- s/p  shunt placement- no issues    Cardiovascular:    RCRI: 0 Risk of Mace: 3.9%    Patient denies any chest pain, tightness, heaviness, pressure, radiating pain, palpitations, irregular heartbeats, lightheadedness, cough, congestion, shortness of breath, KERR, PND, near syncope, weight loss or gain.    Good functional capacity  Functional 4 Mets. Patient denies SOB walking up 2 flights of stairs      EKG in [facility]   Encounter Date: 04/08/25   ECG 12 Lead   Result Value    Ventricular Rate 92    Atrial Rate 92    AZ Interval 138    QRS Duration 78    QT Interval 358    QTC Calculation(Bazett) 442    P Axis 54    R Axis 47    T Axis 54    QRS Count 15    Q Onset 220    P Onset 151    P Offset 196    T Offset 399    QTC Fredericia 412    Narrative    Normal sinus rhythm  Normal ECG  When compared with ECG of 09-DEC-2021 08:58,  No significant change was found  Confirmed by Sanjay Carreon ([id number]) on 4/8/2025 1:55:14 PM     Patient states he had a mild MI at the age of 29- did not require any intervention, has no cardiac complaints and has never had issues since.     HTN- on Amlodipine and Tenoretic, will continue      Pulmonary:    Stop Bang score is 4 placing patient at moderate risk for BEN  ARISCAT: 26-44 points, 13.3% risk of in-hospital postoperative pulmonary complication  PRODIGY: Moderate risk for opioid induced respiratory depression    Renal/endo:  Recommendations to avoid nephrotoxic drugs and carefully monitor fluid status to maintain euvolemia. Use dose adjusted medications as needed for the underlying level of renal function.    Heme:  Patient instructed to ambulate as soon as possible postoperatively to  decrease thromboembolic risk.    Initiate mechanical DVT prophylaxis as soon as possible and initiate chemical prophylaxis when deemed safe from a bleeding standpoint post surgery.      Caprini= 3        Risk assessment complete.  Patient is scheduled for a LOW surgical risk procedure.  He IS considered medically optimized for the planned procedure.        Labs/testing obtained in PAT on 4/8/25: CBC, BMP, UA FLEX, EKG    Lab Results   Component Value Date    WBC 5.7 04/08/2025    HGB 16.0 04/08/2025    HCT 48.8 04/08/2025    MCV 93 04/08/2025     04/08/2025     Lab Results   Component Value Date    GLUCOSE 99 04/08/2025    CALCIUM 9.8 04/08/2025     04/08/2025    K 4.8 04/08/2025    CO2 29 04/08/2025     04/08/2025    BUN 16 04/08/2025    CREATININE 1.11 04/08/2025           Follow up/communication: none       Preoperative medication instructions were provided and reviewed with the patient.  Any additional testing or evaluation was explained to the patient.  Nothing by mouth instructions were discussed and patient's questions were answered prior to conclusion to this encounter.  Patient verbalized understanding of preoperative instructions given in preadmission testing; discharge instructions available in EMR.    This note was dictated with speech recognition.  Minor errors may have been detected during use of speech recognition.

## 2025-04-08 NOTE — PREPROCEDURE INSTRUCTIONS
Medication List            Accurate as of April 8, 2025  9:26 AM. Always use your most recent med list.                amLODIPine 10 mg tablet  Commonly known as: Norvasc  Take 1 tablet (10 mg) by mouth once daily.  Medication Adjustments for Surgery: Take/Use as prescribed     atenoloL-chlorthalidone 50-25 mg tablet  Commonly known as: Tenoretic  Take 1 tablet by mouth once daily.  Medication Adjustments for Surgery: Take/Use as prescribed     multivitamin with minerals tablet  Additional Medication Adjustments for Surgery: Other (Comment)  Notes to patient: Stop today 4/8/25     sodium,potassium,mag sulfates 17.5-3.13-1.6 gram solution  Commonly known as: Suprep  Take one bottle twice as directed by the prep instructions  Additional Medication Adjustments for Surgery: Other (Comment)  Notes to patient: Not taking                Preoperative Deep Breathing Exercises  Why it is important to do deep breathing exercises before my surgery?  Deep breathing exercises strengthen your breathing muscles.  This helps you to recover after your surgery and decreases the chance of breathing complications.  How are the deep breathing exercises done?  Sit straight with your back supported.  Breathe in deeply and slowly through your nose. Your lower rib cage should expand and your abdomen may move forward.  Hold that breath for 3 to 5 seconds.  Breathe out through pursed lips, slowly and completely.  Rest and repeat 10 times every hour while awake.  Rest longer if you become dizzy or lightheaded.        CONTACT SURGEON'S OFFICE IF YOU DEVELOP:  * Fever = 100.4 F   * New respiratory symptoms (e.g. cough, shortness of breath, respiratory distress, sore throat)  * Recent loss of taste or smell  *Flu like symptoms such as headache, fatigue or gastrointestinal symptoms  * You develop any open sores, shingles, burning or painful urination   AND/OR:  * You no longer wish to have the surgery.  * Any other personal circumstances change  that may lead to the need to cancel or defer this surgery.  *You were admitted to any hospital within one week of your planned procedure.    SMOKING:  *Quitting smoking can make a huge difference to your health and recovery from surgery.    *If you need help with quitting, call 1-253-QUIT-NOW.    THE DAY OF SURGERY:  *Do not eat any food after midnight the night before your surgery.   *YOU MUST drink 14 OUNCES of clear liquids TWO hours before your instructed ARRIVAL TIME to the hospital. This includes water, black tea/coffee (no milk or cream), apple juice, clear broth and electrolyte drinks (Gatorade).  Please avoid clear liquids that are red in color.   *You may chew gum/mints up to TWO hours before your surgery/procedure.    SURGICAL TIME:  *You will be contacted between 2 p.m. and 6 p.m. the business day before your surgery with your arrival time.  *If you haven't received a call by 6pm, call [phone].  *Scheduled surgery times may change and you will be notified if this occurs-check your personal voicemail for any updates.    ON THE MORNING OF SURGERY:  *Wear comfortable, loose fitting clothing.   *Do not use moisturizers, creams, lotions or perfume.  *All jewelry and valuables should be left at home.  *Prosthetic devices such as contact lenses, hearing aids, dentures, eyelash extensions, hairpins and body piercing must be removed before surgery.    BRING WITH YOU:  *Photo ID and insurance card  *Current list of medications and allergies  *Pacemaker/Defibrillator/Heart stent cards  *CPAP machine and mask  *Slings/splints/crutches  *Copy of your complete Advanced Directive/DHPOA-if applicable  *Neurostimulator implant remote    PARKING AND ARRIVAL:  *Check in at the Main Entrance desk and let them know you are here for surgery.  *You will be directed to the 2nd floor surgical waiting area.    IF YOU ARE HAVING OUTPATIENT/SAME DAY SURGERY:  *A responsible adult MUST accompany you at the time of discharge and  stay with you for 24 hours after your surgery.  *You may NOT drive yourself home after surgery.  *You may use a taxi or ride sharing service ([employer], Uber) to return home ONLY if you are accompanied by a friend or family member.  *Instructions for resuming your medications will be provided by your surgeon.

## 2025-04-08 NOTE — CPM/PAT NURSE NOTE
CPM/PAT Nurse Note      Name: Adryan Perez (Adryan Perez)  /Age: 1975/49 y.o.       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    Family History   Problem Relation Name Age of Onset    Hypertension Mother      Hypertension Father      Hypertension Brother      Hypertension Brother      No Known Problems Daughter      No Known Problems Son      No Known Problems Mother's Sister      No Known Problems Mother's Brother      No Known Problems Father's Sister      No Known Problems Father's Brother      No Known Problems Maternal Grandmother      Hypertension Maternal Grandfather      Hypertension Paternal Grandmother      Hypertension Paternal Grandfather         No Known Allergies    Prior to Admission medications    Medication Sig Start Date End Date Taking? Authorizing Provider   amLODIPine (Norvasc) 10 mg tablet Take 1 tablet (10 mg) by mouth once daily. 10/3/24  Yes Carmen Gentile MD   atenoloL-chlorthalidone (Tenoretic) 50-25 mg tablet Take 1 tablet by mouth once daily. 24  Yes Carmen Gentile MD   multivitamin with minerals tablet Take 1 tablet by mouth once daily.   Yes Historical Provider, MD   sodium,potassium,mag sulfates (Suprep) 17.5-3.13-1.6 gram recon soln solution Take one bottle twice as directed by the prep instructions  Patient not taking: Reported on 2025 10/30/24    Chilo Bartholomew MD        PAT ROS     DASI Risk Score      Flowsheet Row Questionnaire Series Submission from 3/18/2025 in [facility] with Generic Provider Gretel   Can you take care of yourself (eat, dress, bathe, or use toilet)?  2.75  filed at 03/18/2025 1038   Can you walk indoors, such as around your house? 1.75  filed at 03/18/2025 1038   Can you walk a block or two on level ground?  2.75  filed at 03/18/2025 1038   Can you climb a flight of stairs or walk up a hill? 5.5  filed at 03/18/2025 1038   Can you run a short distance? 8  filed at 03/18/2025 1038   Can you do light work around the house like dusting or washing dishes? 2.7  filed at 03/18/2025 1038   Can you do moderate work around the house like vacuuming, sweeping floors or carrying groceries? 3.5  filed at 03/18/2025 1038   Can you do heavy work around the house like scrubbing floors or lifting and moving heavy furniture?  8  filed at 03/18/2025 1038   Can you do yard work like raking leaves, weeding or pushing a mower? 4.5  filed at 03/18/2025 1038   Can you have sexual relations? 5.25  filed at 03/18/2025 1038   Can you participate in moderate recreational activities like golf, bowling, dancing, doubles tennis or throwing a baseball or football? 6  filed at 03/18/2025 1038   Can you participate in strenous sports like swimming, singles tennis, football, basketball, or skiing? 7.5  filed at 03/18/2025 1038   DASI SCORE 58.2  filed at 03/18/2025 1038   METS Score (Will be calculated only when all the questions are answered) 9.9  filed at 03/18/2025 1038          Caprini DVT Assessment    No data to display       Modified Frailty Index    No data to display       [id number] Stroke Risk Points  Current as of 6 minutes ago        N/A 0 to 9 Points:      Last Change: N/A          The [id number] risk score (Lip CONNIE, et al. 2009. © 2010 [employer]) quantifies the risk of stroke for a patient with atrial fibrillation. For  patients without atrial fibrillation or under the age of 18 this score appears as N/A. Higher score values generally indicate higher risk of stroke.        This score is not applicable to this patient. Components are not calculated.          Revised Cardiac Risk Index    No data to display       Apfel Simplified Score    No data to display       Risk Analysis Index Results This Encounter    No data found in the last 10 encounters.       Stop Bang Score      Flowsheet Row Questionnaire Series Submission from 3/18/2025 in [facility] with Generic Provider Gretel   Do you snore loudly? 1  filed at 03/18/2025 1038   Do you often feel tired or fatigued after your sleep? 0  filed at 03/18/2025 1038   Has anyone ever observed you stop breathing in your sleep? 1  filed at 03/18/2025 1038   Do you have or are you being treated for high blood pressure? 1  filed at 03/18/2025 1038   Recent BMI (Calculated) 34.7  filed at 03/18/2025 1038   Is BMI greater than 35 kg/m2? 0=No  filed at 03/18/2025 1038   Age older than 50 years old? 1=Yes  filed at 03/18/2025 1038   Gender - Male 1=Yes  filed at 03/18/2025 1038          Prodigy: High Risk  Total Score: 8              Prodigy Gender Score          ARISCAT Score for Postoperative Pulmonary Complications    No data to display       Long Perioperative Risk for Myocardial Infarction or Cardiac Arrest (CAMERON)    No data to display         Nurse Plan of Action:     After Visit Summary (AVS) reviewed and patient verbalized good understanding of medications and NPO instructions.

## 2025-04-10 ENCOUNTER — ANESTHESIA EVENT (OUTPATIENT)
Dept: OPERATING ROOM | Facility: HOSPITAL | Age: 50
End: 2025-04-10
Payer: COMMERCIAL

## 2025-04-10 RX ORDER — DIPHENHYDRAMINE HYDROCHLORIDE 50 MG/ML
25 INJECTION, SOLUTION INTRAMUSCULAR; INTRAVENOUS ONCE AS NEEDED
Status: CANCELLED | OUTPATIENT
Start: 2025-04-10

## 2025-04-10 RX ORDER — OXYCODONE HYDROCHLORIDE 5 MG/1
5 TABLET ORAL EVERY 4 HOURS PRN
Status: CANCELLED | OUTPATIENT
Start: 2025-04-10

## 2025-04-10 RX ORDER — ONDANSETRON HYDROCHLORIDE 2 MG/ML
4 INJECTION, SOLUTION INTRAVENOUS ONCE AS NEEDED
Status: CANCELLED | OUTPATIENT
Start: 2025-04-10

## 2025-04-10 RX ORDER — DROPERIDOL 2.5 MG/ML
0.62 INJECTION, SOLUTION INTRAMUSCULAR; INTRAVENOUS ONCE AS NEEDED
Status: CANCELLED | OUTPATIENT
Start: 2025-04-10

## 2025-04-10 RX ORDER — ACETAMINOPHEN 325 MG/1
650 TABLET ORAL EVERY 4 HOURS PRN
Status: CANCELLED | OUTPATIENT
Start: 2025-04-10

## 2025-04-10 RX ORDER — ALBUTEROL SULFATE 0.83 MG/ML
2.5 SOLUTION RESPIRATORY (INHALATION) ONCE AS NEEDED
Status: CANCELLED | OUTPATIENT
Start: 2025-04-10

## 2025-04-10 NOTE — ANESTHESIA PREPROCEDURE EVALUATION
Patient: Adryan Perez    Procedure Information       Date/Time: 25 0855    Procedure: Transperineal Prostate Biopsy (Prostate)    Location: [facility] A OR 05 / Virtual [facility] A OR    Surgeons: Ricky Quinteros MD            Relevant Problems   Cardiac   (+) Benign essential hypertension   (+) Hyperlipidemia      /Renal   (+) Malignant neoplasm of prostate (Multi)       Clinical information reviewed:   Tobacco  Allergies  Meds   Med Hx  Surg Hx   Fam Hx  Soc Hx         Past Medical History:   Diagnosis Date    BPPV (benign paroxysmal positional vertigo)     Congenital hydrocephalus, unspecified     Elevated PSA     Essential (primary) hypertension     HLD (hyperlipidemia)     10/12/24 Chol 209, , Triglycerides 105    Old myocardial infarction     History of heart attack- , no interventions, no issues since then, no longer follows with cardiology    Prediabetes     10/12/24 A1c 6.2      Past Surgical History:   Procedure Laterality Date    VENTRICULOPERITONEAL SHUNT      Since infancy, multiple revisions, last age 15    WISDOM TOOTH EXTRACTION       Social History     Tobacco Use    Smoking status: Former     Types: Cigars     Quit date: 2006     Years since quittin.3    Smokeless tobacco: Never   Vaping Use    Vaping status: Never Used   Substance Use Topics    Alcohol use: Yes     Alcohol/week: 1.0 standard drink of alcohol     Types: 1 Cans of beer per week    Drug use: Never      Current Outpatient Medications   Medication Instructions    amLODIPine (NORVASC) 10 mg, oral, Daily    atenoloL-chlorthalidone (Tenoretic) 50-25 mg tablet 1 tablet, oral, Daily    multivitamin with minerals tablet 1 tablet, Daily    sodium,potassium,mag sulfates (Suprep) 17.5-3.13-1.6 gram recon soln solution Take one bottle twice as directed by the prep instructions      No Known Allergies     Chemistry    Lab Results   Component Value Date/Time     2025 1005    K 4.8 2025 1005      "04/08/2025 1005    CO2 29 04/08/2025 1005    BUN 16 04/08/2025 1005    CREATININE 1.11 04/08/2025 1005    Lab Results   Component Value Date/Time    CALCIUM 9.8 04/08/2025 1005    ALKPHOS 91 10/12/2024 1112    AST 28 10/12/2024 1112    ALT 32 10/12/2024 1112    BILITOT 0.6 10/12/2024 1112          Lab Results   Component Value Date    HGBA1C 6.2 (H) 10/12/2024     Lab Results   Component Value Date/Time    WBC 5.7 04/08/2025 1005    HGB 16.0 04/08/2025 1005    HCT 48.8 04/08/2025 1005     04/08/2025 1005     No results found for: \"PROTIME\", \"PTT\", \"INR\"  No results found for: \"ABORH\"  Encounter Date: 04/08/25   ECG 12 Lead   Result Value    Ventricular Rate 92    Atrial Rate 92    CT Interval 138    QRS Duration 78    QT Interval 358    QTC Calculation(Bazett) 442    P Axis 54    R Axis 47    T Axis 54    QRS Count 15    Q Onset 220    P Onset 151    P Offset 196    T Offset 399    QTC Fredericia 412    Narrative    Normal sinus rhythm  Normal ECG  When compared with ECG of 09-DEC-2021 08:58,  No significant change was found  Confirmed by Sanjay Carreon ([id number]) on 4/8/2025 1:55:14 PM     No results found for this or any previous visit from the past 1095 days.       Visit Vitals  BP (!) 141/101   Pulse 87   Temp 36 °C (96.8 °F) (Temporal)   Resp 17   Ht 1.778 m (5' 10\")   Wt 118 kg (261 lb 0.4 oz)   SpO2 97%   BMI 37.45 kg/m²   Smoking Status Former   BSA 2.41 m²     NPO/Void Status  Date of Last Liquid: 04/10/25  Time of Last Liquid: 2230  Date of Last Solid: 04/10/25  Time of Last Solid: 2230  Last Intake Type: Clear fluids; Food        Physical Exam    Airway  Mallampati: III  TM distance: >3 FB  Neck ROM: full     Cardiovascular - normal exam  Rhythm: regular  Rate: normal     Dental   (+) upper dentures     Pulmonary - normal exam     Abdominal - normal exam             Anesthesia Plan    History of general anesthesia?: yes  History of complications of general anesthesia?: no    ASA 3     MAC   (Standard ASA " monitoring.)  intravenous induction   Anesthetic plan and risks discussed with patient.    Plan discussed with CRNA and CAA.

## 2025-04-11 ENCOUNTER — ANESTHESIA (OUTPATIENT)
Dept: OPERATING ROOM | Facility: HOSPITAL | Age: 50
End: 2025-04-11
Payer: COMMERCIAL

## 2025-04-11 ENCOUNTER — HOSPITAL ENCOUNTER (OUTPATIENT)
Facility: HOSPITAL | Age: 50
Setting detail: OUTPATIENT SURGERY
Discharge: HOME | End: 2025-04-11
Attending: STUDENT IN AN ORGANIZED HEALTH CARE EDUCATION/TRAINING PROGRAM | Admitting: STUDENT IN AN ORGANIZED HEALTH CARE EDUCATION/TRAINING PROGRAM
Payer: COMMERCIAL

## 2025-04-11 VITALS
RESPIRATION RATE: 21 BRPM | BODY MASS INDEX: 37.37 KG/M2 | WEIGHT: 261.02 LBS | SYSTOLIC BLOOD PRESSURE: 144 MMHG | OXYGEN SATURATION: 97 % | DIASTOLIC BLOOD PRESSURE: 81 MMHG | HEIGHT: 70 IN | TEMPERATURE: 96.8 F | HEART RATE: 85 BPM

## 2025-04-11 DIAGNOSIS — C61 MALIGNANT NEOPLASM OF PROSTATE (MULTI): ICD-10-CM

## 2025-04-11 LAB — GLUCOSE BLD MANUAL STRIP-MCNC: 104 MG/DL (ref 74–99)

## 2025-04-11 PROCEDURE — 88344 IMHCHEM/IMCYTCHM EA MLT ANTB: CPT | Mod: TC | Performed by: STUDENT IN AN ORGANIZED HEALTH CARE EDUCATION/TRAINING PROGRAM

## 2025-04-11 PROCEDURE — 7100000002 HC RECOVERY ROOM TIME - EACH INCREMENTAL 1 MINUTE: Performed by: STUDENT IN AN ORGANIZED HEALTH CARE EDUCATION/TRAINING PROGRAM

## 2025-04-11 PROCEDURE — 2500000004 HC RX 250 GENERAL PHARMACY W/ HCPCS (ALT 636 FOR OP/ED): Performed by: STUDENT IN AN ORGANIZED HEALTH CARE EDUCATION/TRAINING PROGRAM

## 2025-04-11 PROCEDURE — 2720000007 HC OR 272 NO HCPCS: Performed by: STUDENT IN AN ORGANIZED HEALTH CARE EDUCATION/TRAINING PROGRAM

## 2025-04-11 PROCEDURE — C1819 TISSUE LOCALIZATION-EXCISION: HCPCS | Performed by: STUDENT IN AN ORGANIZED HEALTH CARE EDUCATION/TRAINING PROGRAM

## 2025-04-11 PROCEDURE — 7100000001 HC RECOVERY ROOM TIME - INITIAL BASE CHARGE: Performed by: STUDENT IN AN ORGANIZED HEALTH CARE EDUCATION/TRAINING PROGRAM

## 2025-04-11 PROCEDURE — 3600000007 HC OR TIME - EACH INCREMENTAL 1 MINUTE - PROCEDURE LEVEL TWO: Performed by: STUDENT IN AN ORGANIZED HEALTH CARE EDUCATION/TRAINING PROGRAM

## 2025-04-11 PROCEDURE — A4649 SURGICAL SUPPLIES: HCPCS | Performed by: STUDENT IN AN ORGANIZED HEALTH CARE EDUCATION/TRAINING PROGRAM

## 2025-04-11 PROCEDURE — 3700000002 HC GENERAL ANESTHESIA TIME - EACH INCREMENTAL 1 MINUTE: Performed by: STUDENT IN AN ORGANIZED HEALTH CARE EDUCATION/TRAINING PROGRAM

## 2025-04-11 PROCEDURE — 82947 ASSAY GLUCOSE BLOOD QUANT: CPT

## 2025-04-11 PROCEDURE — 3600000002 HC OR TIME - INITIAL BASE CHARGE - PROCEDURE LEVEL TWO: Performed by: STUDENT IN AN ORGANIZED HEALTH CARE EDUCATION/TRAINING PROGRAM

## 2025-04-11 PROCEDURE — 3700000001 HC GENERAL ANESTHESIA TIME - INITIAL BASE CHARGE: Performed by: STUDENT IN AN ORGANIZED HEALTH CARE EDUCATION/TRAINING PROGRAM

## 2025-04-11 PROCEDURE — 7100000010 HC PHASE TWO TIME - EACH INCREMENTAL 1 MINUTE: Performed by: STUDENT IN AN ORGANIZED HEALTH CARE EDUCATION/TRAINING PROGRAM

## 2025-04-11 PROCEDURE — 76942 ECHO GUIDE FOR BIOPSY: CPT | Performed by: STUDENT IN AN ORGANIZED HEALTH CARE EDUCATION/TRAINING PROGRAM

## 2025-04-11 PROCEDURE — 2500000004 HC RX 250 GENERAL PHARMACY W/ HCPCS (ALT 636 FOR OP/ED): Performed by: ANESTHESIOLOGIST ASSISTANT

## 2025-04-11 PROCEDURE — 55700 PR PROSTATE NEEDLE BIOPSY ANY APPROACH: CPT | Performed by: STUDENT IN AN ORGANIZED HEALTH CARE EDUCATION/TRAINING PROGRAM

## 2025-04-11 PROCEDURE — 7100000009 HC PHASE TWO TIME - INITIAL BASE CHARGE: Performed by: STUDENT IN AN ORGANIZED HEALTH CARE EDUCATION/TRAINING PROGRAM

## 2025-04-11 RX ORDER — PROPOFOL 10 MG/ML
INJECTION, EMULSION INTRAVENOUS CONTINUOUS PRN
Status: DISCONTINUED | OUTPATIENT
Start: 2025-04-11 | End: 2025-04-11

## 2025-04-11 RX ORDER — FENTANYL CITRATE 50 UG/ML
INJECTION, SOLUTION INTRAMUSCULAR; INTRAVENOUS AS NEEDED
Status: DISCONTINUED | OUTPATIENT
Start: 2025-04-11 | End: 2025-04-11

## 2025-04-11 RX ORDER — MIDAZOLAM HYDROCHLORIDE 1 MG/ML
INJECTION INTRAMUSCULAR; INTRAVENOUS AS NEEDED
Status: DISCONTINUED | OUTPATIENT
Start: 2025-04-11 | End: 2025-04-11

## 2025-04-11 RX ADMIN — MIDAZOLAM HYDROCHLORIDE 2 MG: 1 INJECTION, SOLUTION INTRAMUSCULAR; INTRAVENOUS at 09:05

## 2025-04-11 RX ADMIN — FENTANYL CITRATE 50 MCG: 50 INJECTION, SOLUTION INTRAMUSCULAR; INTRAVENOUS at 09:05

## 2025-04-11 RX ADMIN — PROPOFOL 100 MCG/KG/MIN: 10 INJECTION, EMULSION INTRAVENOUS at 09:05

## 2025-04-11 ASSESSMENT — PAIN SCALES - GENERAL
PAINLEVEL_OUTOF10: 0 - NO PAIN

## 2025-04-11 ASSESSMENT — COLUMBIA-SUICIDE SEVERITY RATING SCALE - C-SSRS
6. HAVE YOU EVER DONE ANYTHING, STARTED TO DO ANYTHING, OR PREPARED TO DO ANYTHING TO END YOUR LIFE?: NO
2. HAVE YOU ACTUALLY HAD ANY THOUGHTS OF KILLING YOURSELF?: NO
1. IN THE PAST MONTH, HAVE YOU WISHED YOU WERE DEAD OR WISHED YOU COULD GO TO SLEEP AND NOT WAKE UP?: NO

## 2025-04-11 ASSESSMENT — PAIN - FUNCTIONAL ASSESSMENT
PAIN_FUNCTIONAL_ASSESSMENT: 0-10
PAIN_FUNCTIONAL_ASSESSMENT: UNABLE TO SELF-REPORT
PAIN_FUNCTIONAL_ASSESSMENT: 0-10

## 2025-04-11 NOTE — SIGNIFICANT EVENT
Dr. Quinteros to the bedside.  Pt drinking without nausea.  Declines snack at this time.  Wife updated via phone message.

## 2025-04-11 NOTE — ANESTHESIA POSTPROCEDURE EVALUATION
Patient: Adryan Perez    Procedure Summary       Date: 04/11/25 Room / [facility]: [facility] / [facility]    Anesthesia Start: 0904 Anesthesia Stop: 0930    Procedure: Transperineal Prostate Biopsy (Prostate) Diagnosis:       Malignant neoplasm of prostate (Multi)      (Malignant neoplasm of prostate (Multi) [C61])    Surgeons: Ricky Quinteros MD Responsible Provider: Hernán Corado MD    Anesthesia Type: MAC ASA Status: 3            Anesthesia Type: MAC    Vitals Value Taken Time   /98 04/11/25 1017   Temp 36 °C (96.8 °F) 04/11/25 0929   Pulse 84 04/11/25 1028   Resp 17 04/11/25 1015   SpO2 94 % 04/11/25 1028   Vitals shown include unfiled device data.    Anesthesia Post Evaluation    Patient location during evaluation: PACU  Patient participation: complete - patient participated  Level of consciousness: awake and alert  Pain management: adequate  Airway patency: patent  Cardiovascular status: acceptable and hemodynamically stable  Respiratory status: acceptable, spontaneous ventilation and nonlabored ventilation  Hydration status: acceptable  Postoperative Nausea and Vomiting: none        There were no known notable events for this encounter.

## 2025-04-11 NOTE — SIGNIFICANT EVENT
Pt received from OR.  Placed on continuous cardiac / saO2 monitor.  Oral airway and O2 simple mask maint,.

## 2025-04-11 NOTE — OP NOTE
Transperineal Prostate Biopsy Operative Note     Date: 2025  OR Location: [facility] OR    Name: Adryan Perez, : 1975, Age: 49 y.o., MRN: [id number], Sex: male    Diagnosis  Pre-op Diagnosis      * Malignant neoplasm of prostate (Multi) [C61] Post-op Diagnosis     * Malignant neoplasm of prostate (Multi) [C61]     Procedures  Transperineal Prostate Biopsy  63741 - DC PROSTATE NEEDLE BIOPSY ANY APPROACH    DC BIOPSY OF PROSTATE,NEEDLE,TRANSPERINEAL [I78505]  CHG US TRANSRECTAL [[id number]]  Surgeons      * Ricky Quinteros - Primary    Resident/Fellow/Other Assistant:  Surgeons and Role:  * No surgeons found with a matching role *    Staff:   Circulator: Hope Elizabeth Person: Landy  Circulator: Hellen    Anesthesia Staff: Anesthesiologist: Hernán Corado MD  C-AA: BENY Dillard    Procedure Summary  Anesthesia: Monitor Anesthesia Care  ASA: III  Estimated Blood Loss: 2 mL  Intra-op Medications:   Administrations occurring from 08 to 935 on 25:   Medication Name Total Dose   BUPivacaine HCl (Marcaine) 0.5 % (5 mg/mL) 8 mL, lidocaine (Xylocaine) 8 mL, sodium bicarbonate 1 mEq/mL (8.4 %) 4 mEq syringe 20 mL   fentaNYL (Sublimaze) injection 50 mcg/mL 50 mcg   midazolam PF (Versed) injection 1 mg/mL 2 mg   propofol (Diprivan) injection 10 mg/mL 224.96 mg              Anesthesia Record               Intraprocedure I/O Totals          Output    Est. Blood Loss 0 mL    Total Output 0 mL          Specimen:   ID Type Source Tests Collected by Time   1 : Left Paramedian [place] Tissue PROSTATE NEEDLE BIOPSY LEFT SURGICAL PATHOLOGY EXAM Ricky Quinteros MD 2025   2 : Right Anterior Tissue PROSTATE NEEDLE BIOPSY RIGHT SURGICAL PATHOLOGY EXAM Ricky Quinteros MD 2025   3 : Right Lateral Tissue PROSTATE NEEDLE BIOPSY RIGHT SURGICAL PATHOLOGY EXAM Ricky Quinteros MD 2025   4 : Right Posterior Base Tissue PROSTATE NEEDLE BIOPSY RIGHT SURGICAL PATHOLOGY EXAM Ricky Quinteros MD 2025   5  : Right Posterior [place] Tissue PROSTATE NEEDLE BIOPSY RIGHT SURGICAL PATHOLOGY EXAM Ricky Quinteros MD 4/11/2025 0818   6 : Right Paramedian Base Tissue PROSTATE NEEDLE BIOPSY RIGHT SURGICAL PATHOLOGY EXAM Ricky Quinteros MD 4/11/2025 0818   7 : Right Paramedian [place] Tissue PROSTATE NEEDLE BIOPSY RIGHT SURGICAL PATHOLOGY EXAM Ricky Quinteros MD 4/11/2025 0818   8 : Left Anterior Tissue PROSTATE NEEDLE BIOPSY LEFT SURGICAL PATHOLOGY EXAM Ricky Quinteros MD 4/11/2025 0818   9 : Left Lateral Tissue PROSTATE NEEDLE BIOPSY LEFT SURGICAL PATHOLOGY EXAM Ricky Quinteros MD 4/11/2025 0818   10 : Left Paramedian [place] Tissue PROSTATE NEEDLE BIOPSY LEFT SURGICAL PATHOLOGY EXAM Ricky Quinteros MD 4/11/2025 0818   11 : Left Posterior [place] Tissue PROSTATE NEEDLE BIOPSY LEFT SURGICAL PATHOLOGY EXAM Ricky Quinteros MD 4/11/2025 0818   12 : Left Posterior Base Tissue PROSTATE NEEDLE BIOPSY LEFT SURGICAL PATHOLOGY EXAM Ricky Quinteros MD 4/11/2025 0818   13 : [place] #1 Tissue PROSTATE BIOPSY TARGETED [place] SURGICAL PATHOLOGY EXAM Ricky Quinteros MD 4/11/2025 0818   14 : [place] #2 Tissue PROSTATE BIOPSY TARGETED [place] SURGICAL PATHOLOGY EXAM Ricky Quinteros MD 4/11/2025 0818        Indications: Adryan Perez is an 49 y.o. male who is having surgery for Malignant neoplasm of prostate (Multi) [C61].     The patient was seen in the preoperative area. The risks, benefits, complications, treatment options, non-operative alternatives, expected recovery and outcomes were discussed with the patient. The possibilities of reaction to medication, pulmonary aspiration, injury to surrounding structures, bleeding, recurrent infection, the need for additional procedures, failure to diagnose a condition, and creating a complication requiring transfusion or operation were discussed with the patient. The patient concurred with the proposed plan, giving informed consent.  The site of surgery was properly noted/marked if necessary per policy. The patient has been actively  warmed in preoperative area. Preoperative antibiotics are not indicated. Venous thrombosis prophylaxis are not indicated.    Procedure Details:   Preoperative Diagnosis: Elevated PSA; abnormal prostate findings on MRI    Postoperative Diagnosis: Same    Operation Performed: MR/TRUS fusion guided biopsy via transperineal approach    Attending: Aldair    Assistant(s):     Anesthesia: Sedation and Local    Preparation: Betadine    EBL: Minimal     Complications: None     Indications for procedure: This 49 y.o. year old male presents with a history of Elevated PSA.      MRI Findings:   IMPRESSION:  1. Two PI-RADS 4 lesions in the right posterior paramedian midgland  peripheral zone and left anterior forearm of the midglandperipheral  zone. No definite signs of gross extracapsular extension.  2. No evidence of enlarged pelvic lymph nodes.    Procedure and Findings:     The patient was seen in the preoperative area. The risks, benefits, complications, treatment options, non-operative alternatives, expected recovery and outcomes were discussed with the patient. The possibilities of reaction to medication, pulmonary aspiration, injury to surrounding structures, bleeding, recurrent infection, the need for additional procedures, failure to diagnose a condition, and creating a complication requiring transfusion or operation were discussed with the patient. The patient concurred with the proposed plan, giving informed consent.  The site of surgery was properly noted/marked if necessary per policy. The patient has been actively warmed in preoperative area. Preoperative antibiotics are not indicated. Venous thrombosis prophylaxis are not indicated.    The rational for transrectal ultrasound and biopsy of the prostate including risk, benefits and alternatives were discussed. These included risk of increased urination, urinary retention, hematuria, hematospermia, and urosepsis, the patient elected to proceed.      The  multiparametric MRI data was imported from the radiology PACS system to the UroNav biopsy platform. The T2-weighted images were reviewed including the segmented prostate boundary and pre-identified suspicious lesions (ROIs).     A procedure time out confirmed the proper patient, and the procedure informed consent form had been signed by the patient.    The patient was placed in lithotomy position. A injection mixture of lidocaine, marcaine and sodium bicarbonate was used as local anesthetic for the skin of the perineum and to perform a periapical block.     The electromagnetic sensor was attached to the ultrasound probe. The ultrasound transducer was placed into the rectum. Positioning of the probe and sensor within the generated EM-field was confirmed.     A comprehensive TRUS survey was performed with the prostate visualized in both the transverse and sagittal planes. There were benign calcifications seen on ultrasound. In addition to the MRI fusion, there were not hypoechoic lesions suspicious for tumor identified on ultrasound.     3D-Rendering with trus image processing and fusion:    In the axial plane, an ultrasound sweep of the prostate was completed from base to apex. The serial axial image slices were marked by annotating the anterior, posterior, left, right, base and apical points for semi-automatic segmentation of the prostate. Manual adjustments of the prostate US segmentation was performed in the axial, sagittal and coronal views rendering a 3-D data set/US volume.     Initial rotational co-registration was performed by blending MR images that were overlaid on the US images. The urethra, bladder neck, bladder and posterior surface of the prostate were identified on both MRI and US.     The images were rotated to ensure corresponding anatomy was correctly aligned.     Elastic registration was then calculated for use if required.     The MRI and Ultrasound were then registered using co-display of the  images verifying that base, apex, left and right sides of the prostate were correctly aligned. Manual corrections were performed where need. Additional co-registration of the internal fiducials including prostatic cyst and the pubis were performed.     Prostate Biopsy:     At this point, accurate co-registration/fusion was confirmed. Ultrasound was used to navigate to the centroid target and lesion volume. The segmented ROIs were targeted and biopsied with ultrasound guidance taking four cores from each target. I was satisfied with the location of the targeted biopsies obtained. An additional 12 core, standard, systematic random biopsy was performed for a total of 20 cores of tissue obtained during this biopsy session.      Disposition  Patient tolerated the procedure well and will follow-up for an outpatient appointment to discuss pathology.      Complications:  None; patient tolerated the procedure well.    Disposition: PACU - hemodynamically stable.  Condition: stable       Attending Attestation: I was present and scrubbed for the entire procedure.    Ricky Quinteros  Phone Number: [phone]

## 2025-04-14 ASSESSMENT — PAIN SCALES - GENERAL: PAINLEVEL_OUTOF10: 0 - NO PAIN

## 2025-04-23 LAB
LAB AP ASR DISCLAIMER: NORMAL
LAB AP BLOCK FOR ADDITIONAL STUDIES: NORMAL
LABORATORY COMMENT REPORT: NORMAL
PATH REPORT.FINAL DX SPEC: NORMAL
PATH REPORT.GROSS SPEC: NORMAL
PATH REPORT.RELEVANT HX SPEC: NORMAL
PATH REPORT.TOTAL CANCER: NORMAL

## 2025-05-05 ENCOUNTER — OFFICE VISIT (OUTPATIENT)
Dept: UROLOGY | Facility: HOSPITAL | Age: 50
End: 2025-05-05
Payer: COMMERCIAL

## 2025-05-05 DIAGNOSIS — C61 MALIGNANT NEOPLASM OF PROSTATE (MULTI): ICD-10-CM

## 2025-05-05 DIAGNOSIS — R97.20 ELEVATED PSA: Primary | ICD-10-CM

## 2025-05-05 PROCEDURE — 99214 OFFICE O/P EST MOD 30 MIN: CPT | Performed by: STUDENT IN AN ORGANIZED HEALTH CARE EDUCATION/TRAINING PROGRAM

## 2025-05-05 NOTE — PROGRESS NOTES
UROLOGIC INITIAL EVALUATION     PROBLEM LIST:  1. Elevated PSA  NM PET CT prostate PSMA        HISTORY OF PRESENT ILLNESS:   Adryan Perez is a 49 y.o. male with past medical history of HTN, mixed HLD, prediabetes and elevated PSA. He presents for his initial consultation today via telehealth visit. Has previously seen JAYSHREE Gurrola. Recent PSA on 1/3/25 11.08 and 10.28 on 10/12/24. He is scheduled for TURP on 4/11/25.    Today, he reports feeling overall well. Reports stable urinary symptoms. He denies any fevers, chills, nausea, vomiting.     Interim history:  5/5/25: Patient states he is doing well. He is here to review his prostate biopsy today and discuss treatment plan. Notes no new complaints. He denies any fevers, chills, nausea, vomiting.    PAST MEDICAL HISTORY:  Past Medical History:   Diagnosis Date    BPPV (benign paroxysmal positional vertigo)     Congenital hydrocephalus, unspecified     Elevated PSA     Essential (primary) hypertension     HLD (hyperlipidemia)     10/12/24 Chol 209, , Triglycerides 105    Old myocardial infarction     History of heart attack- 2005, no interventions, no issues since then, no longer follows with cardiology    Prediabetes     10/12/24 A1c 6.2       PAST SURGICAL HISTORY:  Past Surgical History:   Procedure Laterality Date    VENTRICULOPERITONEAL SHUNT      Since infancy, multiple revisions, last age 15    WISDOM TOOTH EXTRACTION          ALLERGIES:   No Known Allergies     MEDICATIONS:     Current Outpatient Medications:     amLODIPine (Norvasc) 10 mg tablet, Take 1 tablet (10 mg) by mouth once daily., Disp: 90 tablet, Rfl: 1    atenoloL-chlorthalidone (Tenoretic) 50-25 mg tablet, Take 1 tablet by mouth once daily., Disp: 90 tablet, Rfl: 0    multivitamin with minerals tablet, Take 1 tablet by mouth once daily., Disp: , Rfl:     sodium,potassium,mag sulfates (Suprep) 17.5-3.13-1.6 gram recon soln solution, Take one bottle twice as directed by the  prep instructions (Patient not taking: Reported on 2025), Disp: 354 mL, Rfl: 0      SOCIAL HISTORY:  Patient  reports that he quit smoking about 18 years ago. His smoking use included cigars. He has never used smokeless tobacco. He reports current alcohol use of about 1.0 standard drink of alcohol per week. He reports that he does not use drugs.   Social History     Socioeconomic History    Marital status: Single     Spouse name: Not on file    Number of children: Not on file    Years of education: Not on file    Highest education level: Not on file   Occupational History    Not on file   Tobacco Use    Smoking status: Former     Types: Cigars     Quit date: 2006     Years since quittin.4    Smokeless tobacco: Never   Vaping Use    Vaping status: Never Used   Substance and Sexual Activity    Alcohol use: Yes     Alcohol/week: 1.0 standard drink of alcohol     Types: 1 Cans of beer per week    Drug use: Never    Sexual activity: Not on file   Other Topics Concern    Not on file   Social History Narrative    Not on file     Social Drivers of Health     Financial Resource Strain: Not on file   Food Insecurity: Not on file   Transportation Needs: Not on file   Physical Activity: Not on file   Stress: Not on file   Social Connections: Not on file   Intimate Partner Violence: Not on file   Housing Stability: Not on file       FAMILY HISTORY:  Family History   Problem Relation Name Age of Onset    Hypertension Mother      Heart attack Mother      Stroke Mother      Hypertension Father      Heart attack Father      Hypertension Brother      Hypertension Brother      Stroke Brother      No Known Problems Maternal Grandmother      Hypertension Maternal Grandfather      Hypertension Paternal Grandmother      Hypertension Paternal Grandfather      No Known Problems Daughter      No Known Problems Son      No Known Problems Mother's Sister      No Known Problems Mother's Brother      No Known Problems Father's  Sister      No Known Problems Father's Brother         REVIEW OF SYSTEMS:  Constitutional: Negative for fever and chills. Denies anorexia, weight loss.  Eyes: Negative for visual disturbance.   Respiratory: Negative for shortness of breath.    Cardiovascular: Negative for chest pain.   Gastrointestinal: Negative for nausea and vomiting.   Genitourinary: See interval history above.  Skin: Negative for rash.   Neurological: Negative for dizziness and numbness.   Psychiatric/Behavioral: Negative for confusion and decreased concentration.     PHYSICAL EXAM:  There were no vitals taken for this visit.  Physical exam limited due to telehealth visit    RADIOLOGY REVIEW:  MRI prostate 1/17/25  FINDINGS:  PROSTATE VOLUME:  The prostate measures 3.8 x 3.7 x 4.3 cm.  Prostate weight is estimated at 31g. PSA density is 0.35 ng/mL/g.      PROSTATE PARENCHYMA:  There is heterogeneous enlargement of the transition zone, consistent  with benign prostatic hyperplasia. A 1.0 x 0.8 cm ill-defined  fusiform T2 hypointense focal lesion is noted in the right posterior  midgland to base peripheral zone, showing focally restricted  diffusion, consistent with a PI-RADS 4 lesion. An additional  similar-appearing lesion is seen in the left anterior horn of the  midgland peripheral zone, measuring 0.7 x 0.7 cm, also scored as  PI-RADS 4.      EXTRACAPSULAR EXTENSION:  There is broad abutment of the adjacent prostatic capsule, without  bulging or irregularity to suggest gross extracapsular extension.      SEMINAL VESICLES:  Within normal limits.      PELVIC LYMPH NODES:  No abnormally enlarged pelvic lymph nodes are identified.      PERITONEUM:  No free or loculated fluid collections are evident in the pelvis.      OTHER ORGANS:  Within normal limits.      BONES:  No focal lesions are noted in the bone.      Exam Quality:  Is T2WI weighted imaging of diagnostic quality:  Yes. T2WI  assessment:  Adequate. Is DWI of diagnostic quality:  Yes.  DWI  assessment:  Optimal. Is DCE of diagnostic quality:  N/A. DCE  assessment:  N/A. PI-QUAL score:  Two or more sequences independently  are of diagnostic quality Comments:      IMPRESSION:  1. Two PI-RADS 4 lesions in the right posterior paramedian midgland  peripheral zone and left anterior forearm of the midglandperipheral  zone. No definite signs of gross extracapsular extension.  2. No evidence of enlarged pelvic lymph nodes.          I personally reviewed the images/study and I agree with the findings  as stated. This study was interpreted at [place], [place].      MACRO:  None      Signed by: Winston Diego 1/19/2025 5:10 PM  Dictation workstation:   [id number]    LABORATORY REVIEW:     Lab Results   Component Value Date    BUN 16 04/08/2025    CREATININE 1.11 04/08/2025    EGFR 80 04/08/2025     04/08/2025    K 4.8 04/08/2025     04/08/2025    CO2 29 04/08/2025    CALCIUM 9.8 04/08/2025      Lab Results   Component Value Date    WBC 5.7 04/08/2025    RBC 5.25 04/08/2025    HGB 16.0 04/08/2025    HCT 48.8 04/08/2025    MCV 93 04/08/2025    MCH 30.5 04/08/2025    MCHC 32.8 04/08/2025    RDW 12.7 04/08/2025     04/08/2025        Lab Results   Component Value Date    PSA 11.08 (H) 01/03/2025    PSA 4.35 (H) 02/19/2021    PSA 3.98 02/17/2020    PSA 1.83 01/12/2018             Assessment:     1. Elevated PSA  NM PET CT prostate PSMA        Plan:   We discussed his presentation in detail.   Reviewed and interpreted patient's biopsy result which revealed prostate cancer.  Discussed with patient the different treatment options of prostate cancer, including radiation therapy and prostatectomy.  Will proceed with surgery as schedule. Patient agrees to the plan.    35 minutes total spent on patient's care today; >50% time spent on counseling/coordination of care    Scribe Attestation  By signing my name below, Rhona PHILLIPS Scribe    attest that this documentation has been prepared under the direction and in the presence of Ricky Quinteros MD.    Scribe Attestation  By signing my name below, I, Johnny Hussein   attest that this documentation has been prepared under the direction and in the presence of Ricky Quinteros MD.

## 2025-05-07 RX ORDER — CHLORHEXIDINE GLUCONATE 40 MG/ML
SOLUTION TOPICAL DAILY PRN
OUTPATIENT
Start: 2025-05-07

## 2025-05-07 RX ORDER — CEFAZOLIN SODIUM 2 G/100ML
2 INJECTION, SOLUTION INTRAVENOUS ONCE
OUTPATIENT
Start: 2025-05-07 | End: 2025-05-07

## 2025-06-16 ENCOUNTER — CLINICAL SUPPORT (OUTPATIENT)
Dept: PREADMISSION TESTING | Facility: HOSPITAL | Age: 50
End: 2025-06-16
Payer: COMMERCIAL

## 2025-06-16 NOTE — CPM/PAT NURSE NOTE
CPM/PAT Nurse Note      Name: Adryan Perez (Adryan Perez)  /Age: 1975/50 y.o.       Medical History[1]    Surgical History[2]    Patient  has no history on file for sexual activity.    Family History[3]    Allergies[4]    Prior to Admission medications    Medication Sig Start Date End Date Taking? Authorizing Provider   amLODIPine (Norvasc) 10 mg tablet Take 1 tablet (10 mg) by mouth once daily. 10/3/24   Carmen Gentile MD   atenoloL-chlorthalidone (Tenoretic) 50-25 mg tablet Take 1 tablet by mouth once daily. 24   Carmen Gentile MD   multivitamin with minerals tablet Take 1 tablet by mouth once daily.    Historical Provider, MD   sodium,potassium,mag sulfates (Suprep) 17.5-3.13-1.6 gram recon soln solution Take one bottle twice as directed by the prep instructions  Patient not taking: Reported on 2025 10/30/24   MD KHAI Boucher ROS     DASI Risk Score      Flowsheet Row Questionnaire Series Submission from 3/18/2025 in [facility] with Generic Provider Gretel   Can you take care of yourself (eat, dress, bathe, or use toilet)?  2.75  filed at 2025 1038   Can you walk indoors, such as around your house? 1.75  filed at 2025 1038   Can you walk a block or two on level ground?  2.75  filed at 2025 1038   Can you climb a flight of stairs or walk up a hill? 5.5  filed at 2025 1038   Can you run a short distance? 8  filed at 2025 1038   Can you do light work around the house like dusting or washing dishes? 2.7  filed at 2025 1038   Can you do moderate work around the house like vacuuming, sweeping floors or carrying groceries? 3.5  filed at 2025 1038   Can you do heavy work around the house like scrubbing floors or lifting and moving heavy furniture?  8  filed at 2025 1038   Can you do yard work like raking leaves, weeding or pushing a mower? 4.5  filed at 2025 1038   Can you have sexual relations? 5.25  filed at  03/18/2025 1038   Can you participate in moderate recreational activities like golf, bowling, dancing, doubles tennis or throwing a baseball or football? 6  filed at 03/18/2025 1038   Can you participate in strenous sports like swimming, singles tennis, football, basketball, or skiing? 7.5  filed at 03/18/2025 1038   DASI SCORE 58.2  filed at 03/18/2025 1038   METS Score (Will be calculated only when all the questions are answered) 9.9  filed at 03/18/2025 1038          Caprini DVT Assessment    No data to display       Modified Frailty Index    No data to display       [id number] Stroke Risk Points  Current as of just now        N/A 0 to 9 Points:      Last Change: N/A          The QBH2DR6-KAMb risk score (Lip CONNIE, et al. 2009. © 2010 American College [employer]) quantifies the risk of stroke for a patient with atrial fibrillation. For patients without atrial fibrillation or under the age of 18 this score appears as N/A. Higher score values generally indicate higher risk of stroke.        This score is not applicable to this patient. Components are not calculated.          Revised Cardiac Risk Index    No data to display       Apfel Simplified Score    No data to display       Risk Analysis Index Results This Encounter    No data found in the last 10 encounters.       Stop Bang Score      Flowsheet Row Questionnaire Series Submission from 3/18/2025 in [facility] with Generic Provider Gretel   Do you snore loudly? 1  filed at 03/18/2025 1038   Do you often feel tired or fatigued after your sleep? 0  filed at 03/18/2025 1038   Has anyone ever observed you stop breathing in your sleep? 1  filed at 03/18/2025 1038   Do you have or are you being treated for high blood pressure? 1  filed at 03/18/2025 1038   Recent BMI (Calculated) 34.7  filed at 03/18/2025 1038   Is BMI greater than 35 kg/m2? 0=No  filed at 03/18/2025 1038   Age older than 50 years old? 1=Yes  filed at 03/18/2025 1038   Gender - Male 1=Yes   filed at 03/18/2025 1038          Prodigy: High Risk  Total Score: 8              Prodigy Gender Score          ARISCAT Score for Postoperative Pulmonary Complications    No data to display       Ethan Perioperative Risk for Myocardial Infarction or Cardiac Arrest (CAMERON)    No data to display         Nurse Plan of Action:     Med only RN screening call complete.  Reviewed allergies, medications and pharmacy.  Instructed patient to stop multivitamin 1 week prior to surgery.             [1]   Past Medical History:  Diagnosis Date    BPPV (benign paroxysmal positional vertigo)     Congenital hydrocephalus, unspecified     Elevated PSA     Essential (primary) hypertension     HLD (hyperlipidemia)     10/12/24 Chol 209, , Triglycerides 105    Old myocardial infarction     History of heart attack- 2005, no interventions, no issues since then, no longer follows with cardiology    Prediabetes     10/12/24 A1c 6.2   [2]   Past Surgical History:  Procedure Laterality Date    VENTRICULOPERITONEAL SHUNT      Since infancy, multiple revisions, last age 15    WISDOM TOOTH EXTRACTION     [3]   Family History  Problem Relation Name Age of Onset    Hypertension Mother      Heart attack Mother      Stroke Mother      Hypertension Father      Heart attack Father      Hypertension Brother      Hypertension Brother      Stroke Brother      No Known Problems Maternal Grandmother      Hypertension Maternal Grandfather      Hypertension Paternal Grandmother      Hypertension Paternal Grandfather      No Known Problems Daughter      No Known Problems Son      No Known Problems Mother's Sister      No Known Problems Mother's Brother      No Known Problems Father's Sister      No Known Problems Father's Brother     [4] No Known Allergies

## 2025-06-18 ENCOUNTER — LAB (OUTPATIENT)
Dept: LAB | Facility: HOSPITAL | Age: 50
End: 2025-06-18
Payer: COMMERCIAL

## 2025-06-18 ENCOUNTER — PRE-ADMISSION TESTING (OUTPATIENT)
Dept: PREADMISSION TESTING | Facility: HOSPITAL | Age: 50
End: 2025-06-18
Payer: COMMERCIAL

## 2025-06-18 VITALS
OXYGEN SATURATION: 98 % | TEMPERATURE: 97.8 F | DIASTOLIC BLOOD PRESSURE: 88 MMHG | WEIGHT: 266.76 LBS | BODY MASS INDEX: 38.19 KG/M2 | SYSTOLIC BLOOD PRESSURE: 155 MMHG | HEIGHT: 70 IN | RESPIRATION RATE: 18 BRPM | HEART RATE: 90 BPM

## 2025-06-18 DIAGNOSIS — C61 MALIGNANT NEOPLASM OF PROSTATE (MULTI): Primary | ICD-10-CM

## 2025-06-18 DIAGNOSIS — Z01.818 PREOP TESTING: Primary | ICD-10-CM

## 2025-06-18 LAB
ANION GAP SERPL CALC-SCNC: 12 MMOL/L (ref 10–20)
APPEARANCE UR: CLEAR
APTT PPP: 30 SECONDS (ref 26–36)
BILIRUB UR STRIP.AUTO-MCNC: NEGATIVE MG/DL
BUN SERPL-MCNC: 15 MG/DL (ref 6–23)
CALCIUM SERPL-MCNC: 9.3 MG/DL (ref 8.6–10.3)
CHLORIDE SERPL-SCNC: 103 MMOL/L (ref 98–107)
CO2 SERPL-SCNC: 25 MMOL/L (ref 21–32)
COLOR UR: COLORLESS
CREAT SERPL-MCNC: 1 MG/DL (ref 0.5–1.3)
EGFRCR SERPLBLD CKD-EPI 2021: >90 ML/MIN/1.73M*2
ERYTHROCYTE [DISTWIDTH] IN BLOOD BY AUTOMATED COUNT: 12.7 % (ref 11.5–14.5)
GLUCOSE SERPL-MCNC: 97 MG/DL (ref 74–99)
GLUCOSE UR STRIP.AUTO-MCNC: NORMAL MG/DL
HCT VFR BLD AUTO: 42.8 % (ref 41–52)
HGB BLD-MCNC: 14.7 G/DL (ref 13.5–17.5)
INR PPP: 1 (ref 0.9–1.1)
KETONES UR STRIP.AUTO-MCNC: NEGATIVE MG/DL
LEUKOCYTE ESTERASE UR QL STRIP.AUTO: NEGATIVE
MCH RBC QN AUTO: 31.5 PG (ref 26–34)
MCHC RBC AUTO-ENTMCNC: 34.3 G/DL (ref 32–36)
MCV RBC AUTO: 92 FL (ref 80–100)
NITRITE UR QL STRIP.AUTO: NEGATIVE
NRBC BLD-RTO: 0 /100 WBCS (ref 0–0)
PH UR STRIP.AUTO: 5.5 [PH]
PLATELET # BLD AUTO: 251 X10*3/UL (ref 150–450)
POTASSIUM SERPL-SCNC: 4.6 MMOL/L (ref 3.5–5.3)
PROT UR STRIP.AUTO-MCNC: NEGATIVE MG/DL
PROTHROMBIN TIME: 10.5 SECONDS (ref 9.8–12.4)
RBC # BLD AUTO: 4.67 X10*6/UL (ref 4.5–5.9)
RBC # UR STRIP.AUTO: NEGATIVE MG/DL
SODIUM SERPL-SCNC: 135 MMOL/L (ref 136–145)
SP GR UR STRIP.AUTO: 1
UROBILINOGEN UR STRIP.AUTO-MCNC: NORMAL MG/DL
WBC # BLD AUTO: 5.7 X10*3/UL (ref 4.4–11.3)

## 2025-06-18 PROCEDURE — 85730 THROMBOPLASTIN TIME PARTIAL: CPT

## 2025-06-18 PROCEDURE — 85027 COMPLETE CBC AUTOMATED: CPT

## 2025-06-18 PROCEDURE — 99214 OFFICE O/P EST MOD 30 MIN: CPT | Performed by: PHYSICIAN ASSISTANT

## 2025-06-18 PROCEDURE — 85610 PROTHROMBIN TIME: CPT

## 2025-06-18 PROCEDURE — 81003 URINALYSIS AUTO W/O SCOPE: CPT | Performed by: PHYSICIAN ASSISTANT

## 2025-06-18 PROCEDURE — 80048 BASIC METABOLIC PNL TOTAL CA: CPT

## 2025-06-18 PROCEDURE — 36415 COLL VENOUS BLD VENIPUNCTURE: CPT

## 2025-06-18 ASSESSMENT — ENCOUNTER SYMPTOMS
ENDOCRINE NEGATIVE: 1
CONSTITUTIONAL NEGATIVE: 1
NEUROLOGICAL NEGATIVE: 1
HEMATOLOGIC/LYMPHATIC NEGATIVE: 1
EYES NEGATIVE: 1
GASTROINTESTINAL NEGATIVE: 1
ALLERGIC/IMMUNOLOGIC NEGATIVE: 1
CARDIOVASCULAR NEGATIVE: 1
MUSCULOSKELETAL NEGATIVE: 1
PSYCHIATRIC NEGATIVE: 1
RESPIRATORY NEGATIVE: 1

## 2025-06-18 NOTE — PREPROCEDURE INSTRUCTIONS
Medication List            Accurate as of June 18, 2025  3:54 PM. Always use your most recent med list.                amLODIPine 10 mg tablet  Commonly known as: Norvasc  Take 1 tablet (10 mg) by mouth once daily.  Medication Adjustments for Surgery: Take on the morning of surgery     atenoloL-chlorthalidone 50-25 mg tablet  Commonly known as: Tenoretic  Take 1 tablet by mouth once daily.  Medication Adjustments for Surgery: Take on the morning of surgery     multivitamin with minerals tablet  Notes to patient: HOLD 7 Days prior to surgery - Last dose 6/15 Stop NOW if not already stopped      sodium,potassium,mag sulfates 17.5-3.13-1.6 gram solution  Commonly known as: Suprep  Take one bottle twice as directed by the prep instructions                 Concerning above medication instructions - If medication is normally taken at night continue normal schedule - do not take night prior and morning of surgery.       Preoperative Deep Breathing Exercises  Why it is important to do deep breathing exercises before my surgery?  Deep breathing exercises strengthen your breathing muscles.  This helps you to recover after your surgery and decreases the chance of breathing complications.  How are the deep breathing exercises done?  Sit straight with your back supported.  Breathe in deeply and slowly through your nose. Your lower rib cage should expand and your abdomen may move forward.  Hold that breath for 3 to 5 seconds.  Breathe out through pursed lips, slowly and completely.  Rest and repeat 10 times every hour while awake.  Rest longer if you become dizzy or lightheaded.      CONTACT SURGEON'S OFFICE IF YOU DEVELOP:  * Fever = 100.4 F   * New respiratory symptoms (e.g. cough, shortness of breath, respiratory distress, sore throat)  * Recent loss of taste or smell  *Flu like symptoms such as headache, fatigue or gastrointestinal symptoms  * You develop any open sores, shingles, burning or painful urination   AND/OR:  *  You no longer wish to have the surgery.  * Any other personal circumstances change that may lead to the need to cancel or defer this surgery.  *You were admitted to any hospital within one week of your planned procedure.    SMOKING:  *Quitting smoking can make a huge difference to your health and recovery from surgery.    *If you need help with quitting, call 5-634-QUIT-NOW.    THE DAY OF SURGERY:  *Do not eat any food after midnight the night before surgery/procedure.   *YOU MUST DRINK 14 oz drink clear liquids (i.e. water, black coffee/tea, (no milk or cream) apple juice, and electrolyte drinks (Gatorade)  2 hours before your instructed arrival time to the hospital.  *You may chew gum until  2 hours before your surgery/procedure.    SURGICAL TIME  *You will be contacted between 2 p.m. and 6 p.m. the business day before your surgery with your arrival time.  *If you haven't received a call by 6pm, call [phone].  *Scheduled surgery times may change and you will be notified if this occurs-check your personal voicemail for any updates.    ON THE MORNING OF SURGERY:  *Wear comfortable, loose fitting clothing.   *Do not use moisturizers, creams, lotions or perfume.  *All jewelry and valuables should be left at home.  *Prosthetic devices such as contact lenses, hearing aids, dentures, eyelash extensions, hairpins and body piercing must be removed before surgery.    BRING WITH YOU:  *Photo ID and insurance card  *Current list of medicines and allergies  *Pacemaker/Defibrillator/Heart stent cards  *CPAP machine and mask  *Slings/splints/crutches  *Copy of your complete Advanced Directive/DHPOA-if applicable  *Neurostimulator implant remote    PARKING AND ARRIVAL:  *Check in at the Main Entrance desk and let them know you are here for surgery.  *You will be directed to the 2nd floor surgical waiting area.    AFTER OUTPATIENT SURGERY:  *A responsible adult MUST accompany you at the time of discharge and stay with you for  24 hours after your surgery.  *You may NOT drive yourself home after surgery.  *You may use a taxi or ride sharing service (Lyft, Uber) to return home ONLY if you are accompanied by a friend or family member.  *Instructions for resuming your medications will be provided by your surgeon.

## 2025-06-18 NOTE — CPM/PAT NURSE NOTE
CPM/PAT Nurse Note      Name: Adryan Perez (Adryan Perez)  /Age: 1975/50 y.o.       Medical History[1]    Surgical History[2]    Patient  has no history on file for sexual activity.    Family History[3]    Allergies[4]    Prior to Admission medications    Medication Sig Start Date End Date Taking? Authorizing Provider   amLODIPine (Norvasc) 10 mg tablet Take 1 tablet (10 mg) by mouth once daily. 10/3/24  Yes Carmen Gentile MD   atenoloL-chlorthalidone (Tenoretic) 50-25 mg tablet Take 1 tablet by mouth once daily. 24  Yes Carmen Gentile MD   multivitamin with minerals tablet Take 1 tablet by mouth once daily.   Yes Historical Provider, MD   sodium,potassium,mag sulfates (Suprep) 17.5-3.13-1.6 gram recon soln solution Take one bottle twice as directed by the prep instructions  Patient not taking: Reported on 2025 10/30/24   Chilo Bartholomew MD        [facility] ROS     DASI Risk Score      Flowsheet Row Questionnaire Series Submission from 3/18/2025 in [facility] with Generic Provider Gretel   Can you take care of yourself (eat, dress, bathe, or use toilet)?  2.75  filed at 2025 1038   Can you walk indoors, such as around your house? 1.75  filed at 2025 1038   Can you walk a block or two on level ground?  2.75  filed at 2025 1038   Can you climb a flight of stairs or walk up a hill? 5.5  filed at 2025 1038   Can you run a short distance? 8  filed at 2025 1038   Can you do light work around the house like dusting or washing dishes? 2.7  filed at 2025 1038   Can you do moderate work around the house like vacuuming, sweeping floors or carrying groceries? 3.5  filed at 2025 1038   Can you do heavy work around the house like scrubbing floors or lifting and moving heavy furniture?  8  filed at 2025 1038   Can you do yard work like raking leaves, weeding or pushing a mower? 4.5  filed at 2025 1038   Can you have sexual relations? 5.25  filed at  03/18/2025 1038   Can you participate in moderate recreational activities like golf, bowling, dancing, doubles tennis or throwing a baseball or football? 6  filed at 03/18/2025 1038   Can you participate in strenous sports like swimming, singles tennis, football, basketball, or skiing? 7.5  filed at 03/18/2025 1038   DASI SCORE 58.2  filed at 03/18/2025 1038   METS Score (Will be calculated only when all the questions are answered) 9.9  filed at 03/18/2025 1038          Caprini DVT Assessment    No data to display       Modified Frailty Index    No data to display       [id number] Stroke Risk Points  Current as of just now        N/A 0 to 9 Points:      Last Change: N/A          The [id number]-XGSh risk score (Lip CONNIE, et al. 2009. © 2010 American College [employer] Chest [employer]icians) quantifies the risk of stroke for a patient with atrial fibrillation. For patients without atrial fibrillation or under the age of 18 this score appears as N/A. Higher score values generally indicate higher risk of stroke.        This score is not applicable to this patient. Components are not calculated.          Revised Cardiac Risk Index    No data to display       Apfel Simplified Score    No data to display       Risk Analysis Index Results This Encounter    No data found in the last 10 encounters.       Stop Bang Score      Flowsheet Row Questionnaire Series Submission from 3/18/2025 in [facility] with Generic Provider Gretel   Do you snore loudly? 1  filed at 03/18/2025 1038   Do you often feel tired or fatigued after your sleep? 0  filed at 03/18/2025 1038   Has anyone ever observed you stop breathing in your sleep? 1  filed at 03/18/2025 1038   Do you have or are you being treated for high blood pressure? 1  filed at 03/18/2025 1038   Recent BMI (Calculated) 34.7  filed at 03/18/2025 1038   Is BMI greater than 35 kg/m2? 0=No  filed at 03/18/2025 1038   Age older than 50 years old? 1=Yes  filed at 03/18/2025 1038   Gender - Male 1=Yes   filed at 03/18/2025 1038          Prodigy: High Risk  Total Score: 8              Prodigy Gender Score          ARISCAT Score for Postoperative Pulmonary Complications    No data to display       Ethan Perioperative Risk for Myocardial Infarction or Cardiac Arrest (CAMERON)    No data to display         Nurse Plan of Action:       After Visit Summary (AVS) reviewed and patient verbalized good understanding of medications and NPO instructions.            [1]   Past Medical History:  Diagnosis Date    BPPV (benign paroxysmal positional vertigo)     Congenital hydrocephalus, unspecified     Elevated PSA     Essential (primary) hypertension     HLD (hyperlipidemia)     10/12/24 Chol 209, , Triglycerides 105    Old myocardial infarction     History of heart attack- 2005, no interventions, no issues since then, no longer follows with cardiology    Prediabetes     10/12/24 A1c 6.2   [2]   Past Surgical History:  Procedure Laterality Date    VENTRICULOPERITONEAL SHUNT      Since infancy, multiple revisions, last age 15    WISDOM TOOTH EXTRACTION     [3]   Family History  Problem Relation Name Age of Onset    Hypertension Mother      Heart attack Mother      Stroke Mother      Hypertension Father      Heart attack Father      Hypertension Brother      Hypertension Brother      Stroke Brother      No Known Problems Maternal Grandmother      Hypertension Maternal Grandfather      Hypertension Paternal Grandmother      Hypertension Paternal Grandfather      No Known Problems Daughter      No Known Problems Son      No Known Problems Mother's Sister      No Known Problems Mother's Brother      No Known Problems Father's Sister      No Known Problems Father's Brother     [4] No Known Allergies

## 2025-06-18 NOTE — CPM/PAT H&P
[facility] Evaluation       Name: Adryan Perez (Adryan Perez)  /Age: 1975/50 y.o.     In-Person       Chief Complaint: Elevated PSA [R97.20]     HPI      Date of Consult: 25    Referring Provider: Dr. Quinteros     Surgery, Date, and Length: Robot Assisted Prostatectomy ; 25; 200 minutes     Adryan Perez  is a 50 year-old male who presents to the [facility] for perioperative risk assessment prior to surgery.  He presented with elevated PSA.   MR prostate 25: 1. Two PI-RADS 4 lesions in the right posterior paramedian mid gland peripheral zone and left anterior forearm of the midglandperipheral  zone. No definite signs of gross extracapsular extension.  2. No evidence of enlarged pelvic lymph nodes.  Patient had surgical prostate biopsy 25 with pathology showing prostatic carcinoma.       This note was created in part upon personal review of patient's medical records.      Patient is scheduled to have Robot Assisted Prostatectomy     Medical History  Past Medical History:   Diagnosis Date    BPPV (benign paroxysmal positional vertigo)     Congenital hydrocephalus, unspecified     Elevated PSA     Essential (primary) hypertension     HLD (hyperlipidemia)     10/12/24 Chol 209, , Triglycerides 105    Old myocardial infarction     History of heart attack- , no interventions, no issues since then, no longer follows with cardiology    Prediabetes             Surgical History  Past Surgical History:   Procedure Laterality Date    PROSTATE BIOPSY  2025    VENTRICULOPERITONEAL SHUNT      Since infancy, multiple revisions, last age 15    WISDOM TOOTH EXTRACTION               Family history:  Family History   Problem Relation Name Age of Onset    Hypertension Mother      Heart attack Mother      Stroke Mother      Hypertension Father      Heart attack Father      Hypertension Brother      Hypertension Brother      Stroke Brother      No Known Problems Maternal Grandmother      Hypertension  "Maternal Grandfather      Hypertension Paternal Grandmother      Hypertension Paternal Grandfather      No Known Problems Daughter      No Known Problems Son      No Known Problems Mother's Sister      No Known Problems Mother's Brother      No Known Problems Father's Sister      No Known Problems Father's Brother          Social history:  Social History     Socioeconomic History    Marital status: Single     Spouse name: Not on file    Number of children: Not on file    Years of education: Not on file    Highest education level: Not on file   Occupational History    Not on file   Tobacco Use    Smoking status: Former     Types: Cigars     Quit date: 2006     Years since quittin.5    Smokeless tobacco: Never   Vaping Use    Vaping status: Never Used   Substance and Sexual Activity    Alcohol use: Yes     Alcohol/week: 1.0 standard drink of alcohol     Types: 1 Cans of beer per week    Drug use: Never    Sexual activity: Not on file   Other Topics Concern    Not on file   Social History Narrative    Not on file     Social Drivers of Health     Financial Resource Strain: Not on file   Food Insecurity: Not on file   Transportation Needs: Not on file   Physical Activity: Not on file   Stress: Not on file   Social Connections: Not on file   Intimate Partner Violence: Not on file   Housing Stability: Not on file        Current Outpatient Medications   Medication Instructions    amLODIPine (NORVASC) 10 mg, oral, Daily    atenoloL-chlorthalidone (Tenoretic) 50-25 mg tablet 1 tablet, oral, Daily    multivitamin with minerals tablet 1 tablet, Daily    sodium,potassium,mag sulfates (Suprep) 17.5-3.13-1.6 gram recon soln solution Take one bottle twice as directed by the prep instructions            Visit Vitals  /88   Pulse 90   Temp 36.6 °C (97.8 °F)   Resp 18   Ht 1.77 m (5' 9.69\")   Wt 121 kg (266 lb 12.1 oz)   SpO2 98%   BMI 38.62 kg/m²   Smoking Status Former   BSA 2.44 m²         Review of Systems "   Constitutional: Negative.    HENT: Negative.     Eyes: Negative.         Glasses   Respiratory: Negative.     Cardiovascular: Negative.         METS 4   regular exercise 5x/week / work has walking and lifting Without chest pain / SOB    Gastrointestinal: Negative.    Endocrine: Negative.    Genitourinary: Negative.    Musculoskeletal: Negative.    Skin: Negative.    Allergic/Immunologic: Negative.    Neurological: Negative.    Hematological: Negative.    Psychiatric/Behavioral: Negative.          Physical Exam  Constitutional:       Appearance: Normal appearance. He is obese.   HENT:      Head: Normocephalic and atraumatic.      Right Ear: External ear normal.      Left Ear: External ear normal.      Nose: Nose normal.      Mouth/Throat:      Pharynx: Oropharynx is clear.   Eyes:      Conjunctiva/sclera: Conjunctivae normal.   Cardiovascular:      Rate and Rhythm: Normal rate and regular rhythm.      Heart sounds: Normal heart sounds.   Pulmonary:      Effort: Pulmonary effort is normal.      Breath sounds: Normal breath sounds.   Abdominal:      General: Abdomen is flat.      Palpations: Abdomen is soft.   Musculoskeletal:         General: Normal range of motion.      Cervical back: Normal range of motion and neck supple.   Skin:     General: Skin is warm and dry.   Neurological:      General: No focal deficit present.      Mental Status: He is alert and oriented to person, place, and time.   Psychiatric:         Mood and Affect: Mood normal.         Behavior: Behavior normal.         Thought Content: Thought content normal.         Judgment: Judgment normal.          PAT AIRWAY:   Airway:     Mallampati::  III   partials    LABS:  Lab Results   Component Value Date    WBC 5.7 06/18/2025    HGB 14.7 06/18/2025    HCT 42.8 06/18/2025    MCV 92 06/18/2025     06/18/2025      Lab Results   Component Value Date    GLUCOSE 97 06/18/2025    CALCIUM 9.3 06/18/2025     (L) 06/18/2025    K 4.6 06/18/2025     CO2 25 06/18/2025     06/18/2025    BUN 15 06/18/2025    CREATININE 1.00 06/18/2025      Lab Results   Component Value Date    ALT 32 10/12/2024    AST 28 10/12/2024    ALKPHOS 91 10/12/2024    BILITOT 0.6 10/12/2024      Lab Results   Component Value Date    INR 1.0 06/18/2025    PROTIME 10.5 06/18/2025      Urinalysis with Reflex Culture and Microscopic  Order: [id number] - Part of Panel Order [id number]   Status: Final result       Dx: Preop testing    Test Result Released: Yes (seen)    0 Result Notes       Component  Ref Range & Units 1 d ago 2 mo ago   Color, Urine  Light-Yellow, Yellow, Dark-Yellow Colorless Normal (N) Light-Yellow   Appearance, Urine  Clear Clear Clear   Specific Gravity, Urine  1.005 - 1.035 1.005 1.029   pH, Urine  5.0, 5.5, 6.0, 6.5, 7.0, 7.5, 8.0 5.5 5.0   Protein, Urine  NEGATIVE, 10 (TRACE), 20 (TRACE) mg/dL NEGATIVE NEGATIVE   Glucose, Urine  Normal mg/dL Normal Normal   Blood, Urine  NEGATIVE mg/dL NEGATIVE NEGATIVE   Ketones, Urine  NEGATIVE mg/dL NEGATIVE NEGATIVE   Bilirubin, Urine  NEGATIVE mg/dL NEGATIVE NEGATIVE   Urobilinogen, Urine  Normal mg/dL Normal Normal   Nitrite, Urine  NEGATIVE NEGATIVE NEGATIVE   Leukocyte Esterase, Urine  NEGATIVE NEGATIVE NEGATIVE   Resulting Agency [facility]             Specimen Collected: 06/18/25 15:59     Encounter Date: 04/08/25   ECG 12 Lead   Result Value    Ventricular Rate 92    Atrial Rate 92    MD Interval 138    QRS Duration 78    QT Interval 358    QTC Calculation(Bazett) 442    P Axis 54    R Axis 47    T Axis 54    QRS Count 15    Q Onset 220    P Onset 151    P Offset 196    T Offset 399    QTC Fredericia 412    Narrative    Normal sinus rhythm  Normal ECG  When compared with ECG of 09-DEC-2021 08:58,  No significant change was found  Confirmed by Sanjay Carreon ([id number]) on 4/8/2025 1:55:14 PM      Patient is scheduled to have Robot Assisted Prostatectomy     Cardiovascular  METS: 4   RCRI: 1  , 6 % Risk of MACE  Caprini: 6    HTN-  amlodipine and atenolol-chlorthalidone Continue DOS     Pulmonary:  Stop Bang score is 6 placing patient at high risk for BEN  ARISCAT: 26-44 points, 13.3% risk of in-hospital postoperative pulmonary complication  PRODIGY: High risk for opioid induced respiratory depression     Hematology       Patient instructed to ambulate as soon as possible postoperatively to decrease thromboembolic risk.      Initiate mechanical DVT prophylaxis as soon as possible and initiate chemical prophylaxis when deemed safe from a bleeding standpoint post surgery.       VTE prophylaxis per surgical team         Tests ordered in PAT: Cbc, bmp, coag, ua ; labs unremarkable      Risk assessment complete.  Patient is scheduled for a intermediate  surgical risk procedure.        Preoperative medication instructions were provided and reviewed with the patient.  Any additional testing or evaluation was explained to the patient.  Nothing by mouth instructions were discussed and patient's questions were answered prior to conclusion to this encounter.  Patient verbalized understanding of preoperative instructions given in preadmission testing; discharge instructions available in EMR.

## 2025-06-18 NOTE — H&P (VIEW-ONLY)
[facility] Evaluation       Name: Adryan Perez (Adryan Perez)  /Age: 1975/50 y.o.     In-Person       Chief Complaint: Elevated PSA [R97.20]     HPI      Date of Consult: 25    Referring Provider: Dr. Quinteros     Surgery, Date, and Length: Robot Assisted Prostatectomy ; 25; 200 minutes     Adryan Perez  is a 50 year-old male who presents to the [facility] for perioperative risk assessment prior to surgery.  He presented with elevated PSA.   MR prostate 25: 1. Two PI-RADS 4 lesions in the right posterior paramedian mid gland peripheral zone and left anterior forearm of the midglandperipheral  zone. No definite signs of gross extracapsular extension.  2. No evidence of enlarged pelvic lymph nodes.  Patient had surgical prostate biopsy 25 with pathology showing prostatic carcinoma.       This note was created in part upon personal review of patient's medical records.      Patient is scheduled to have Robot Assisted Prostatectomy     Medical History  Past Medical History:   Diagnosis Date    BPPV (benign paroxysmal positional vertigo)     Congenital hydrocephalus, unspecified     Elevated PSA     Essential (primary) hypertension     HLD (hyperlipidemia)     10/12/24 Chol 209, , Triglycerides 105    Old myocardial infarction     History of heart attack- , no interventions, no issues since then, no longer follows with cardiology    Prediabetes             Surgical History  Past Surgical History:   Procedure Laterality Date    PROSTATE BIOPSY  2025    VENTRICULOPERITONEAL SHUNT      Since infancy, multiple revisions, last age 15    WISDOM TOOTH EXTRACTION               Family history:  Family History   Problem Relation Name Age of Onset    Hypertension Mother      Heart attack Mother      Stroke Mother      Hypertension Father      Heart attack Father      Hypertension Brother      Hypertension Brother      Stroke Brother      No Known Problems Maternal Grandmother      Hypertension  "Maternal Grandfather      Hypertension Paternal Grandmother      Hypertension Paternal Grandfather      No Known Problems Daughter      No Known Problems Son      No Known Problems Mother's Sister      No Known Problems Mother's Brother      No Known Problems Father's Sister      No Known Problems Father's Brother          Social history:  Social History     Socioeconomic History    Marital status: Single     Spouse name: Not on file    Number of children: Not on file    Years of education: Not on file    Highest education level: Not on file   Occupational History    Not on file   Tobacco Use    Smoking status: Former     Types: Cigars     Quit date: 2006     Years since quittin.5    Smokeless tobacco: Never   Vaping Use    Vaping status: Never Used   Substance and Sexual Activity    Alcohol use: Yes     Alcohol/week: 1.0 standard drink of alcohol     Types: 1 Cans of beer per week    Drug use: Never    Sexual activity: Not on file   Other Topics Concern    Not on file   Social History Narrative    Not on file     Social Drivers of Health     Financial Resource Strain: Not on file   Food Insecurity: Not on file   Transportation Needs: Not on file   Physical Activity: Not on file   Stress: Not on file   Social Connections: Not on file   Intimate Partner Violence: Not on file   Housing Stability: Not on file        Current Outpatient Medications   Medication Instructions    amLODIPine (NORVASC) 10 mg, oral, Daily    atenoloL-chlorthalidone (Tenoretic) 50-25 mg tablet 1 tablet, oral, Daily    multivitamin with minerals tablet 1 tablet, Daily    sodium,potassium,mag sulfates (Suprep) 17.5-3.13-1.6 gram recon soln solution Take one bottle twice as directed by the prep instructions            Visit Vitals  /88   Pulse 90   Temp 36.6 °C (97.8 °F)   Resp 18   Ht 1.77 m (5' 9.69\")   Wt 121 kg (266 lb 12.1 oz)   SpO2 98%   BMI 38.62 kg/m²   Smoking Status Former   BSA 2.44 m²         Review of Systems "   Constitutional: Negative.    HENT: Negative.     Eyes: Negative.         Glasses   Respiratory: Negative.     Cardiovascular: Negative.         METS 4   regular exercise 5x/week / work has walking and lifting Without chest pain / SOB    Gastrointestinal: Negative.    Endocrine: Negative.    Genitourinary: Negative.    Musculoskeletal: Negative.    Skin: Negative.    Allergic/Immunologic: Negative.    Neurological: Negative.    Hematological: Negative.    Psychiatric/Behavioral: Negative.          Physical Exam  Constitutional:       Appearance: Normal appearance. He is obese.   HENT:      Head: Normocephalic and atraumatic.      Right Ear: External ear normal.      Left Ear: External ear normal.      Nose: Nose normal.      Mouth/Throat:      Pharynx: Oropharynx is clear.   Eyes:      Conjunctiva/sclera: Conjunctivae normal.   Cardiovascular:      Rate and Rhythm: Normal rate and regular rhythm.      Heart sounds: Normal heart sounds.   Pulmonary:      Effort: Pulmonary effort is normal.      Breath sounds: Normal breath sounds.   Abdominal:      General: Abdomen is flat.      Palpations: Abdomen is soft.   Musculoskeletal:         General: Normal range of motion.      Cervical back: Normal range of motion and neck supple.   Skin:     General: Skin is warm and dry.   Neurological:      General: No focal deficit present.      Mental Status: He is alert and oriented to person, place, and time.   Psychiatric:         Mood and Affect: Mood normal.         Behavior: Behavior normal.         Thought Content: Thought content normal.         Judgment: Judgment normal.          PAT AIRWAY:   Airway:     Mallampati::  III   partials    LABS:  Lab Results   Component Value Date    WBC 5.7 06/18/2025    HGB 14.7 06/18/2025    HCT 42.8 06/18/2025    MCV 92 06/18/2025     06/18/2025      Lab Results   Component Value Date    GLUCOSE 97 06/18/2025    CALCIUM 9.3 06/18/2025     (L) 06/18/2025    K 4.6 06/18/2025     CO2 25 06/18/2025     06/18/2025    BUN 15 06/18/2025    CREATININE 1.00 06/18/2025      Lab Results   Component Value Date    ALT 32 10/12/2024    AST 28 10/12/2024    ALKPHOS 91 10/12/2024    BILITOT 0.6 10/12/2024      Lab Results   Component Value Date    INR 1.0 06/18/2025    PROTIME 10.5 06/18/2025      Urinalysis with Reflex Culture and Microscopic  Order: [id number] - Part of Panel Order [id number]   Status: Final result       Dx: Preop testing    Test Result Released: Yes (seen)    0 Result Notes       Component  Ref Range & Units 1 d ago 2 mo ago   Color, Urine  Light-Yellow, Yellow, Dark-Yellow Colorless Normal (N) Light-Yellow   Appearance, Urine  Clear Clear Clear   Specific Gravity, Urine  1.005 - 1.035 1.005 1.029   pH, Urine  5.0, 5.5, 6.0, 6.5, 7.0, 7.5, 8.0 5.5 5.0   Protein, Urine  NEGATIVE, 10 (TRACE), 20 (TRACE) mg/dL NEGATIVE NEGATIVE   Glucose, Urine  Normal mg/dL Normal Normal   Blood, Urine  NEGATIVE mg/dL NEGATIVE NEGATIVE   Ketones, Urine  NEGATIVE mg/dL NEGATIVE NEGATIVE   Bilirubin, Urine  NEGATIVE mg/dL NEGATIVE NEGATIVE   Urobilinogen, Urine  Normal mg/dL Normal Normal   Nitrite, Urine  NEGATIVE NEGATIVE NEGATIVE   Leukocyte Esterase, Urine  NEGATIVE NEGATIVE NEGATIVE   Resulting Agency [facility]             Specimen Collected: 06/18/25 15:59     Encounter Date: 04/08/25   ECG 12 Lead   Result Value    Ventricular Rate 92    Atrial Rate 92    GA Interval 138    QRS Duration 78    QT Interval 358    QTC Calculation(Bazett) 442    P Axis 54    R Axis 47    T Axis 54    QRS Count 15    Q Onset 220    P Onset 151    P Offset 196    T Offset 399    QTC Fredericia 412    Narrative    Normal sinus rhythm  Normal ECG  When compared with ECG of 09-DEC-2021 08:58,  No significant change was found  Confirmed by Sanjay Carreon ([id number]) on 4/8/2025 1:55:14 PM      Patient is scheduled to have Robot Assisted Prostatectomy     Cardiovascular  METS: 4   RCRI: 1  , 6 % Risk of MACE  Caprini: 6    HTN-  amlodipine and atenolol-chlorthalidone Continue DOS     Pulmonary:  Stop Bang score is 6 placing patient at high risk for BEN  ARISCAT: 26-44 points, 13.3% risk of in-hospital postoperative pulmonary complication  PRODIGY: High risk for opioid induced respiratory depression     Hematology       Patient instructed to ambulate as soon as possible postoperatively to decrease thromboembolic risk.      Initiate mechanical DVT prophylaxis as soon as possible and initiate chemical prophylaxis when deemed safe from a bleeding standpoint post surgery.       VTE prophylaxis per surgical team         Tests ordered in PAT: Cbc, bmp, coag, ua ; labs unremarkable      Risk assessment complete.  Patient is scheduled for a intermediate  surgical risk procedure.        Preoperative medication instructions were provided and reviewed with the patient.  Any additional testing or evaluation was explained to the patient.  Nothing by mouth instructions were discussed and patient's questions were answered prior to conclusion to this encounter.  Patient verbalized understanding of preoperative instructions given in preadmission testing; discharge instructions available in EMR.

## 2025-06-22 ENCOUNTER — ANESTHESIA EVENT (OUTPATIENT)
Dept: OPERATING ROOM | Facility: HOSPITAL | Age: 50
DRG: 708 | End: 2025-06-22
Payer: COMMERCIAL

## 2025-06-23 ENCOUNTER — HOSPITAL ENCOUNTER (INPATIENT)
Facility: HOSPITAL | Age: 50
LOS: 1 days | Discharge: HOME | DRG: 708 | End: 2025-06-24
Attending: STUDENT IN AN ORGANIZED HEALTH CARE EDUCATION/TRAINING PROGRAM | Admitting: STUDENT IN AN ORGANIZED HEALTH CARE EDUCATION/TRAINING PROGRAM
Payer: COMMERCIAL

## 2025-06-23 ENCOUNTER — ANESTHESIA (OUTPATIENT)
Dept: OPERATING ROOM | Facility: HOSPITAL | Age: 50
DRG: 708 | End: 2025-06-23
Payer: COMMERCIAL

## 2025-06-23 DIAGNOSIS — Z90.79 S/P PROSTATECTOMY: ICD-10-CM

## 2025-06-23 DIAGNOSIS — C61 MALIGNANT NEOPLASM OF PROSTATE (MULTI): ICD-10-CM

## 2025-06-23 DIAGNOSIS — R97.20 ELEVATED PSA: Primary | ICD-10-CM

## 2025-06-23 LAB
ABO GROUP (TYPE) IN BLOOD: NORMAL
ANTIBODY SCREEN: NORMAL
RH FACTOR (ANTIGEN D): NORMAL

## 2025-06-23 PROCEDURE — 51990 LAPARO URETHRAL SUSPENSION: CPT | Performed by: STUDENT IN AN ORGANIZED HEALTH CARE EDUCATION/TRAINING PROGRAM

## 2025-06-23 PROCEDURE — 2500000004 HC RX 250 GENERAL PHARMACY W/ HCPCS (ALT 636 FOR OP/ED): Performed by: STUDENT IN AN ORGANIZED HEALTH CARE EDUCATION/TRAINING PROGRAM

## 2025-06-23 PROCEDURE — 96372 THER/PROPH/DIAG INJ SC/IM: CPT | Performed by: NURSE ANESTHETIST, CERTIFIED REGISTERED

## 2025-06-23 PROCEDURE — 1100000001 HC PRIVATE ROOM DAILY

## 2025-06-23 PROCEDURE — 88307 TISSUE EXAM BY PATHOLOGIST: CPT | Performed by: PATHOLOGY

## 2025-06-23 PROCEDURE — 88305 TISSUE EXAM BY PATHOLOGIST: CPT | Performed by: PATHOLOGY

## 2025-06-23 PROCEDURE — 55866 LAPS SURG PRST8ECT RPBIC RAD: CPT | Performed by: STUDENT IN AN ORGANIZED HEALTH CARE EDUCATION/TRAINING PROGRAM

## 2025-06-23 PROCEDURE — 36415 COLL VENOUS BLD VENIPUNCTURE: CPT | Performed by: STUDENT IN AN ORGANIZED HEALTH CARE EDUCATION/TRAINING PROGRAM

## 2025-06-23 PROCEDURE — 3600000018 HC OR TIME - INITIAL BASE CHARGE - PROCEDURE LEVEL SIX: Performed by: STUDENT IN AN ORGANIZED HEALTH CARE EDUCATION/TRAINING PROGRAM

## 2025-06-23 PROCEDURE — 2780000003 HC OR 278 NO HCPCS: Performed by: STUDENT IN AN ORGANIZED HEALTH CARE EDUCATION/TRAINING PROGRAM

## 2025-06-23 PROCEDURE — A55866 PR LAP,PROSTATECTOMY,RADICAL,W/NERVE SPARE,INCL ROBOTIC: Performed by: STUDENT IN AN ORGANIZED HEALTH CARE EDUCATION/TRAINING PROGRAM

## 2025-06-23 PROCEDURE — 88309 TISSUE EXAM BY PATHOLOGIST: CPT | Performed by: PATHOLOGY

## 2025-06-23 PROCEDURE — 2500000005 HC RX 250 GENERAL PHARMACY W/O HCPCS: Performed by: STUDENT IN AN ORGANIZED HEALTH CARE EDUCATION/TRAINING PROGRAM

## 2025-06-23 PROCEDURE — 38571 LAPAROSCOPY LYMPHADENECTOMY: CPT | Performed by: PHYSICIAN ASSISTANT

## 2025-06-23 PROCEDURE — 8E0W4CZ ROBOTIC ASSISTED PROCEDURE OF TRUNK REGION, PERCUTANEOUS ENDOSCOPIC APPROACH: ICD-10-PCS

## 2025-06-23 PROCEDURE — 3700000002 HC GENERAL ANESTHESIA TIME - EACH INCREMENTAL 1 MINUTE: Performed by: STUDENT IN AN ORGANIZED HEALTH CARE EDUCATION/TRAINING PROGRAM

## 2025-06-23 PROCEDURE — 86901 BLOOD TYPING SEROLOGIC RH(D): CPT | Performed by: STUDENT IN AN ORGANIZED HEALTH CARE EDUCATION/TRAINING PROGRAM

## 2025-06-23 PROCEDURE — 2500000001 HC RX 250 WO HCPCS SELF ADMINISTERED DRUGS (ALT 637 FOR MEDICARE OP): Performed by: NURSE PRACTITIONER

## 2025-06-23 PROCEDURE — 0VT04ZZ RESECTION OF PROSTATE, PERCUTANEOUS ENDOSCOPIC APPROACH: ICD-10-PCS

## 2025-06-23 PROCEDURE — 2500000001 HC RX 250 WO HCPCS SELF ADMINISTERED DRUGS (ALT 637 FOR MEDICARE OP)

## 2025-06-23 PROCEDURE — 7100000001 HC RECOVERY ROOM TIME - INITIAL BASE CHARGE: Performed by: STUDENT IN AN ORGANIZED HEALTH CARE EDUCATION/TRAINING PROGRAM

## 2025-06-23 PROCEDURE — 2720000007 HC OR 272 NO HCPCS: Performed by: STUDENT IN AN ORGANIZED HEALTH CARE EDUCATION/TRAINING PROGRAM

## 2025-06-23 PROCEDURE — 38571 LAPAROSCOPY LYMPHADENECTOMY: CPT | Performed by: STUDENT IN AN ORGANIZED HEALTH CARE EDUCATION/TRAINING PROGRAM

## 2025-06-23 PROCEDURE — 07BC4ZZ EXCISION OF PELVIS LYMPHATIC, PERCUTANEOUS ENDOSCOPIC APPROACH: ICD-10-PCS

## 2025-06-23 PROCEDURE — 3600000017 HC OR TIME - EACH INCREMENTAL 1 MINUTE - PROCEDURE LEVEL SIX: Performed by: STUDENT IN AN ORGANIZED HEALTH CARE EDUCATION/TRAINING PROGRAM

## 2025-06-23 PROCEDURE — 51990 LAPARO URETHRAL SUSPENSION: CPT | Performed by: PHYSICIAN ASSISTANT

## 2025-06-23 PROCEDURE — A55866 PR LAP,PROSTATECTOMY,RADICAL,W/NERVE SPARE,INCL ROBOTIC: Performed by: NURSE ANESTHETIST, CERTIFIED REGISTERED

## 2025-06-23 PROCEDURE — 3700000001 HC GENERAL ANESTHESIA TIME - INITIAL BASE CHARGE: Performed by: STUDENT IN AN ORGANIZED HEALTH CARE EDUCATION/TRAINING PROGRAM

## 2025-06-23 PROCEDURE — 0VT34ZZ RESECTION OF BILATERAL SEMINAL VESICLES, PERCUTANEOUS ENDOSCOPIC APPROACH: ICD-10-PCS

## 2025-06-23 PROCEDURE — 2500000004 HC RX 250 GENERAL PHARMACY W/ HCPCS (ALT 636 FOR OP/ED): Mod: JW | Performed by: NURSE ANESTHETIST, CERTIFIED REGISTERED

## 2025-06-23 PROCEDURE — 2500000004 HC RX 250 GENERAL PHARMACY W/ HCPCS (ALT 636 FOR OP/ED): Mod: JZ

## 2025-06-23 PROCEDURE — 7100000002 HC RECOVERY ROOM TIME - EACH INCREMENTAL 1 MINUTE: Performed by: STUDENT IN AN ORGANIZED HEALTH CARE EDUCATION/TRAINING PROGRAM

## 2025-06-23 PROCEDURE — 55866 LAPS SURG PRST8ECT RPBIC RAD: CPT | Performed by: PHYSICIAN ASSISTANT

## 2025-06-23 PROCEDURE — 88307 TISSUE EXAM BY PATHOLOGIST: CPT | Mod: TC,AHULAB,WESLAB | Performed by: STUDENT IN AN ORGANIZED HEALTH CARE EDUCATION/TRAINING PROGRAM

## 2025-06-23 PROCEDURE — 2500000001 HC RX 250 WO HCPCS SELF ADMINISTERED DRUGS (ALT 637 FOR MEDICARE OP): Performed by: STUDENT IN AN ORGANIZED HEALTH CARE EDUCATION/TRAINING PROGRAM

## 2025-06-23 RX ORDER — ESMOLOL HYDROCHLORIDE 10 MG/ML
INJECTION INTRAVENOUS AS NEEDED
Status: DISCONTINUED | OUTPATIENT
Start: 2025-06-23 | End: 2025-06-23

## 2025-06-23 RX ORDER — INDOMETHACIN 25 MG/1
CAPSULE ORAL AS NEEDED
Status: DISCONTINUED | OUTPATIENT
Start: 2025-06-23 | End: 2025-06-23 | Stop reason: HOSPADM

## 2025-06-23 RX ORDER — AMOXICILLIN 250 MG
2 CAPSULE ORAL 2 TIMES DAILY
Status: DISCONTINUED | OUTPATIENT
Start: 2025-06-23 | End: 2025-06-24 | Stop reason: HOSPADM

## 2025-06-23 RX ORDER — ALBUTEROL SULFATE 0.83 MG/ML
2.5 SOLUTION RESPIRATORY (INHALATION) ONCE AS NEEDED
Status: DISCONTINUED | OUTPATIENT
Start: 2025-06-23 | End: 2025-06-23 | Stop reason: HOSPADM

## 2025-06-23 RX ORDER — MIDAZOLAM HYDROCHLORIDE 1 MG/ML
INJECTION INTRAMUSCULAR; INTRAVENOUS AS NEEDED
Status: DISCONTINUED | OUTPATIENT
Start: 2025-06-23 | End: 2025-06-23

## 2025-06-23 RX ORDER — POLYETHYLENE GLYCOL 3350 17 G/17G
17 POWDER, FOR SOLUTION ORAL DAILY
Status: DISCONTINUED | OUTPATIENT
Start: 2025-06-23 | End: 2025-06-24 | Stop reason: HOSPADM

## 2025-06-23 RX ORDER — HYDRALAZINE HYDROCHLORIDE 20 MG/ML
5 INJECTION INTRAMUSCULAR; INTRAVENOUS EVERY 30 MIN PRN
Status: DISCONTINUED | OUTPATIENT
Start: 2025-06-23 | End: 2025-06-23 | Stop reason: HOSPADM

## 2025-06-23 RX ORDER — SODIUM CHLORIDE 0.9 G/100ML
INJECTION, SOLUTION IRRIGATION AS NEEDED
Status: DISCONTINUED | OUTPATIENT
Start: 2025-06-23 | End: 2025-06-23 | Stop reason: HOSPADM

## 2025-06-23 RX ORDER — HEPARIN SODIUM 5000 [USP'U]/ML
5000 INJECTION, SOLUTION INTRAVENOUS; SUBCUTANEOUS EVERY 8 HOURS
Status: DISCONTINUED | OUTPATIENT
Start: 2025-06-23 | End: 2025-06-24 | Stop reason: HOSPADM

## 2025-06-23 RX ORDER — LIDOCAINE HYDROCHLORIDE 10 MG/ML
0.1 INJECTION, SOLUTION EPIDURAL; INFILTRATION; INTRACAUDAL; PERINEURAL ONCE
Status: DISCONTINUED | OUTPATIENT
Start: 2025-06-23 | End: 2025-06-23 | Stop reason: HOSPADM

## 2025-06-23 RX ORDER — ROCURONIUM BROMIDE 10 MG/ML
INJECTION, SOLUTION INTRAVENOUS AS NEEDED
Status: DISCONTINUED | OUTPATIENT
Start: 2025-06-23 | End: 2025-06-23

## 2025-06-23 RX ORDER — CHLORHEXIDINE GLUCONATE 40 MG/ML
SOLUTION TOPICAL DAILY PRN
Status: DISCONTINUED | OUTPATIENT
Start: 2025-06-23 | End: 2025-06-23 | Stop reason: HOSPADM

## 2025-06-23 RX ORDER — METHOCARBAMOL 750 MG/1
750 TABLET, FILM COATED ORAL EVERY 6 HOURS SCHEDULED
Status: DISCONTINUED | OUTPATIENT
Start: 2025-06-23 | End: 2025-06-24 | Stop reason: HOSPADM

## 2025-06-23 RX ORDER — KETOROLAC TROMETHAMINE 30 MG/ML
30 INJECTION, SOLUTION INTRAMUSCULAR; INTRAVENOUS EVERY 6 HOURS
Status: DISCONTINUED | OUTPATIENT
Start: 2025-06-23 | End: 2025-06-24 | Stop reason: HOSPADM

## 2025-06-23 RX ORDER — LIDOCAINE 560 MG/1
2 PATCH PERCUTANEOUS; TOPICAL; TRANSDERMAL DAILY
Status: DISCONTINUED | OUTPATIENT
Start: 2025-06-23 | End: 2025-06-24 | Stop reason: HOSPADM

## 2025-06-23 RX ORDER — ONDANSETRON HYDROCHLORIDE 2 MG/ML
INJECTION, SOLUTION INTRAVENOUS AS NEEDED
Status: DISCONTINUED | OUTPATIENT
Start: 2025-06-23 | End: 2025-06-23

## 2025-06-23 RX ORDER — HYDROMORPHONE HYDROCHLORIDE 1 MG/ML
INJECTION, SOLUTION INTRAMUSCULAR; INTRAVENOUS; SUBCUTANEOUS AS NEEDED
Status: DISCONTINUED | OUTPATIENT
Start: 2025-06-23 | End: 2025-06-23

## 2025-06-23 RX ORDER — ACETAMINOPHEN 325 MG/1
650 TABLET ORAL EVERY 4 HOURS PRN
Status: DISCONTINUED | OUTPATIENT
Start: 2025-06-23 | End: 2025-06-23 | Stop reason: HOSPADM

## 2025-06-23 RX ORDER — TRAMADOL HYDROCHLORIDE 50 MG/1
50 TABLET, FILM COATED ORAL ONCE
Status: COMPLETED | OUTPATIENT
Start: 2025-06-23 | End: 2025-06-23

## 2025-06-23 RX ORDER — CEFAZOLIN 1 G/1
INJECTION, POWDER, FOR SOLUTION INTRAVENOUS AS NEEDED
Status: DISCONTINUED | OUTPATIENT
Start: 2025-06-23 | End: 2025-06-23

## 2025-06-23 RX ORDER — CEFAZOLIN SODIUM 2 G/50ML
2 SOLUTION INTRAVENOUS ONCE
Status: DISCONTINUED | OUTPATIENT
Start: 2025-06-23 | End: 2025-06-23 | Stop reason: HOSPADM

## 2025-06-23 RX ORDER — BUPIVACAINE HYDROCHLORIDE 5 MG/ML
INJECTION, SOLUTION PERINEURAL AS NEEDED
Status: DISCONTINUED | OUTPATIENT
Start: 2025-06-23 | End: 2025-06-23 | Stop reason: HOSPADM

## 2025-06-23 RX ORDER — OXYCODONE HYDROCHLORIDE 5 MG/1
5 TABLET ORAL EVERY 4 HOURS PRN
Status: DISCONTINUED | OUTPATIENT
Start: 2025-06-23 | End: 2025-06-23 | Stop reason: HOSPADM

## 2025-06-23 RX ORDER — LIDOCAINE HYDROCHLORIDE 20 MG/ML
INJECTION, SOLUTION EPIDURAL; INFILTRATION; INTRACAUDAL; PERINEURAL AS NEEDED
Status: DISCONTINUED | OUTPATIENT
Start: 2025-06-23 | End: 2025-06-23

## 2025-06-23 RX ORDER — PREGABALIN 50 MG/1
50 CAPSULE ORAL 2 TIMES DAILY
Status: DISCONTINUED | OUTPATIENT
Start: 2025-06-23 | End: 2025-06-24 | Stop reason: HOSPADM

## 2025-06-23 RX ORDER — PROPOFOL 10 MG/ML
INJECTION, EMULSION INTRAVENOUS AS NEEDED
Status: DISCONTINUED | OUTPATIENT
Start: 2025-06-23 | End: 2025-06-23

## 2025-06-23 RX ORDER — SODIUM CHLORIDE, SODIUM LACTATE, POTASSIUM CHLORIDE, CALCIUM CHLORIDE 600; 310; 30; 20 MG/100ML; MG/100ML; MG/100ML; MG/100ML
100 INJECTION, SOLUTION INTRAVENOUS CONTINUOUS
Status: DISCONTINUED | OUTPATIENT
Start: 2025-06-23 | End: 2025-06-24 | Stop reason: HOSPADM

## 2025-06-23 RX ORDER — ONDANSETRON HYDROCHLORIDE 2 MG/ML
4 INJECTION, SOLUTION INTRAVENOUS ONCE AS NEEDED
Status: DISCONTINUED | OUTPATIENT
Start: 2025-06-23 | End: 2025-06-23 | Stop reason: HOSPADM

## 2025-06-23 RX ORDER — ACETAMINOPHEN 325 MG/1
975 TABLET ORAL EVERY 6 HOURS
Status: DISCONTINUED | OUTPATIENT
Start: 2025-06-23 | End: 2025-06-24 | Stop reason: HOSPADM

## 2025-06-23 RX ORDER — WATER 1 ML/ML
INJECTION IRRIGATION AS NEEDED
Status: DISCONTINUED | OUTPATIENT
Start: 2025-06-23 | End: 2025-06-23 | Stop reason: HOSPADM

## 2025-06-23 RX ORDER — HEPARIN SODIUM 5000 [USP'U]/ML
INJECTION, SOLUTION INTRAVENOUS; SUBCUTANEOUS AS NEEDED
Status: DISCONTINUED | OUTPATIENT
Start: 2025-06-23 | End: 2025-06-23

## 2025-06-23 RX ORDER — FENTANYL CITRATE 50 UG/ML
INJECTION, SOLUTION INTRAMUSCULAR; INTRAVENOUS AS NEEDED
Status: DISCONTINUED | OUTPATIENT
Start: 2025-06-23 | End: 2025-06-23

## 2025-06-23 RX ORDER — OXYCODONE HYDROCHLORIDE 5 MG/1
10 TABLET ORAL EVERY 4 HOURS PRN
Status: DISCONTINUED | OUTPATIENT
Start: 2025-06-23 | End: 2025-06-23 | Stop reason: HOSPADM

## 2025-06-23 RX ADMIN — POLYETHYLENE GLYCOL 3350 17 G: 17 POWDER, FOR SOLUTION ORAL at 15:51

## 2025-06-23 RX ADMIN — PROPOFOL 50 MG: 10 INJECTION, EMULSION INTRAVENOUS at 12:12

## 2025-06-23 RX ADMIN — HEPARIN SODIUM 5000 UNITS: 5000 INJECTION, SOLUTION INTRAVENOUS; SUBCUTANEOUS at 22:15

## 2025-06-23 RX ADMIN — KETOROLAC TROMETHAMINE 30 MG: 30 INJECTION, SOLUTION INTRAMUSCULAR at 15:51

## 2025-06-23 RX ADMIN — METHOCARBAMOL 750 MG: 750 TABLET ORAL at 22:13

## 2025-06-23 RX ADMIN — HYDROMORPHONE HYDROCHLORIDE 0.1 MG: 1 INJECTION, SOLUTION INTRAMUSCULAR; INTRAVENOUS; SUBCUTANEOUS at 11:42

## 2025-06-23 RX ADMIN — PREGABALIN 50 MG: 50 CAPSULE ORAL at 22:13

## 2025-06-23 RX ADMIN — SODIUM CHLORIDE, POTASSIUM CHLORIDE, SODIUM LACTATE AND CALCIUM CHLORIDE 100 ML/HR: 600; 310; 30; 20 INJECTION, SOLUTION INTRAVENOUS at 20:26

## 2025-06-23 RX ADMIN — OXYCODONE HYDROCHLORIDE 5 MG: 5 TABLET ORAL at 14:12

## 2025-06-23 RX ADMIN — CEFAZOLIN 3 G: 1 INJECTION, POWDER, FOR SOLUTION INTRAMUSCULAR; INTRAVENOUS at 11:42

## 2025-06-23 RX ADMIN — ROCURONIUM BROMIDE 20 MG: 10 INJECTION, SOLUTION INTRAVENOUS at 08:17

## 2025-06-23 RX ADMIN — ONDANSETRON 4 MG: 2 INJECTION INTRAMUSCULAR; INTRAVENOUS at 11:55

## 2025-06-23 RX ADMIN — HEPARIN SODIUM 5000 UNITS: 5000 INJECTION INTRAVENOUS; SUBCUTANEOUS at 08:29

## 2025-06-23 RX ADMIN — HEPARIN SODIUM 5000 UNITS: 5000 INJECTION, SOLUTION INTRAVENOUS; SUBCUTANEOUS at 15:51

## 2025-06-23 RX ADMIN — HYDROMORPHONE HYDROCHLORIDE 0.1 MG: 1 INJECTION, SOLUTION INTRAMUSCULAR; INTRAVENOUS; SUBCUTANEOUS at 10:32

## 2025-06-23 RX ADMIN — ROCURONIUM BROMIDE 10 MG: 10 INJECTION, SOLUTION INTRAVENOUS at 11:15

## 2025-06-23 RX ADMIN — TRAMADOL HYDROCHLORIDE 50 MG: 50 TABLET, COATED ORAL at 19:11

## 2025-06-23 RX ADMIN — FENTANYL CITRATE 100 MCG: 50 INJECTION, SOLUTION INTRAMUSCULAR; INTRAVENOUS at 07:56

## 2025-06-23 RX ADMIN — ROCURONIUM BROMIDE 10 MG: 10 INJECTION, SOLUTION INTRAVENOUS at 10:32

## 2025-06-23 RX ADMIN — PROPOFOL 150 MG: 10 INJECTION, EMULSION INTRAVENOUS at 07:57

## 2025-06-23 RX ADMIN — Medication 6 L/MIN: at 12:35

## 2025-06-23 RX ADMIN — ESMOLOL HYDROCHLORIDE 30 MG: 10 INJECTION, SOLUTION INTRAVENOUS at 11:55

## 2025-06-23 RX ADMIN — LIDOCAINE HYDROCHLORIDE 100 MG: 20 INJECTION, SOLUTION EPIDURAL; INFILTRATION; INTRACAUDAL; PERINEURAL at 07:56

## 2025-06-23 RX ADMIN — CEFAZOLIN 3 G: 1 INJECTION, POWDER, FOR SOLUTION INTRAMUSCULAR; INTRAVENOUS at 08:14

## 2025-06-23 RX ADMIN — ACETAMINOPHEN 975 MG: 325 TABLET, FILM COATED ORAL at 22:13

## 2025-06-23 RX ADMIN — METHOCARBAMOL 750 MG: 750 TABLET ORAL at 15:51

## 2025-06-23 RX ADMIN — SENNOSIDES AND DOCUSATE SODIUM 2 TABLET: 50; 8.6 TABLET ORAL at 22:13

## 2025-06-23 RX ADMIN — ROCURONIUM BROMIDE 80 MG: 10 INJECTION, SOLUTION INTRAVENOUS at 07:57

## 2025-06-23 RX ADMIN — SODIUM CHLORIDE, POTASSIUM CHLORIDE, SODIUM LACTATE AND CALCIUM CHLORIDE 100 ML/HR: 600; 310; 30; 20 INJECTION, SOLUTION INTRAVENOUS at 15:55

## 2025-06-23 RX ADMIN — SUGAMMADEX 200 MG: 100 INJECTION, SOLUTION INTRAVENOUS at 12:19

## 2025-06-23 RX ADMIN — HYDROMORPHONE HYDROCHLORIDE 0.1 MG: 1 INJECTION, SOLUTION INTRAMUSCULAR; INTRAVENOUS; SUBCUTANEOUS at 10:38

## 2025-06-23 RX ADMIN — SENNOSIDES AND DOCUSATE SODIUM 2 TABLET: 50; 8.6 TABLET ORAL at 15:51

## 2025-06-23 RX ADMIN — KETOROLAC TROMETHAMINE 30 MG: 30 INJECTION, SOLUTION INTRAMUSCULAR at 22:13

## 2025-06-23 RX ADMIN — SODIUM CHLORIDE, POTASSIUM CHLORIDE, SODIUM LACTATE AND CALCIUM CHLORIDE: 600; 310; 30; 20 INJECTION, SOLUTION INTRAVENOUS at 07:46

## 2025-06-23 RX ADMIN — ACETAMINOPHEN 975 MG: 325 TABLET, FILM COATED ORAL at 15:51

## 2025-06-23 RX ADMIN — ESMOLOL HYDROCHLORIDE 30 MG: 10 INJECTION, SOLUTION INTRAVENOUS at 08:11

## 2025-06-23 RX ADMIN — MIDAZOLAM HYDROCHLORIDE 2 MG: 1 INJECTION, SOLUTION INTRAMUSCULAR; INTRAVENOUS at 07:46

## 2025-06-23 SDOH — SOCIAL STABILITY: SOCIAL INSECURITY: WITHIN THE LAST YEAR, HAVE YOU BEEN HUMILIATED OR EMOTIONALLY ABUSED IN OTHER WAYS BY YOUR PARTNER OR EX-PARTNER?: NO

## 2025-06-23 SDOH — SOCIAL STABILITY: SOCIAL INSECURITY: HAVE YOU HAD THOUGHTS OF HARMING ANYONE ELSE?: NO

## 2025-06-23 SDOH — ECONOMIC STABILITY: HOUSING INSECURITY: IN THE LAST 12 MONTHS, WAS THERE A TIME WHEN YOU WERE NOT ABLE TO PAY THE MORTGAGE OR RENT ON TIME?: NO

## 2025-06-23 SDOH — ECONOMIC STABILITY: HOUSING INSECURITY: IN THE PAST 12 MONTHS, HOW MANY TIMES HAVE YOU MOVED WHERE YOU WERE LIVING?: 0

## 2025-06-23 SDOH — SOCIAL STABILITY: SOCIAL INSECURITY
WITHIN THE LAST YEAR, HAVE YOU BEEN RAPED OR FORCED TO HAVE ANY KIND OF SEXUAL ACTIVITY BY YOUR PARTNER OR EX-PARTNER?: NO

## 2025-06-23 SDOH — ECONOMIC STABILITY: TRANSPORTATION INSECURITY: IN THE PAST 12 MONTHS, HAS LACK OF TRANSPORTATION KEPT YOU FROM MEDICAL APPOINTMENTS OR FROM GETTING MEDICATIONS?: NO

## 2025-06-23 SDOH — SOCIAL STABILITY: SOCIAL INSECURITY: WITHIN THE LAST YEAR, HAVE YOU BEEN AFRAID OF YOUR PARTNER OR EX-PARTNER?: NO

## 2025-06-23 SDOH — ECONOMIC STABILITY: HOUSING INSECURITY: AT ANY TIME IN THE PAST 12 MONTHS, WERE YOU HOMELESS OR LIVING IN A SHELTER (INCLUDING NOW)?: NO

## 2025-06-23 SDOH — ECONOMIC STABILITY: INCOME INSECURITY: IN THE PAST 12 MONTHS HAS THE ELECTRIC, GAS, OIL, OR WATER COMPANY THREATENED TO SHUT OFF SERVICES IN YOUR HOME?: NO

## 2025-06-23 SDOH — ECONOMIC STABILITY: FOOD INSECURITY: WITHIN THE PAST 12 MONTHS, THE FOOD YOU BOUGHT JUST DIDN'T LAST AND YOU DIDN'T HAVE MONEY TO GET MORE.: NEVER TRUE

## 2025-06-23 SDOH — SOCIAL STABILITY: SOCIAL INSECURITY: DO YOU FEEL UNSAFE GOING BACK TO THE PLACE WHERE YOU ARE LIVING?: NO

## 2025-06-23 SDOH — HEALTH STABILITY: MENTAL HEALTH: CURRENT SMOKER: 0

## 2025-06-23 SDOH — SOCIAL STABILITY: SOCIAL INSECURITY
WITHIN THE LAST YEAR, HAVE YOU BEEN KICKED, HIT, SLAPPED, OR OTHERWISE PHYSICALLY HURT BY YOUR PARTNER OR EX-PARTNER?: NO

## 2025-06-23 SDOH — ECONOMIC STABILITY: FOOD INSECURITY: WITHIN THE PAST 12 MONTHS, YOU WORRIED THAT YOUR FOOD WOULD RUN OUT BEFORE YOU GOT THE MONEY TO BUY MORE.: NEVER TRUE

## 2025-06-23 SDOH — SOCIAL STABILITY: SOCIAL INSECURITY: ARE YOU OR HAVE YOU BEEN THREATENED OR ABUSED PHYSICALLY, EMOTIONALLY, OR SEXUALLY BY ANYONE?: NO

## 2025-06-23 SDOH — SOCIAL STABILITY: SOCIAL INSECURITY: WERE YOU ABLE TO COMPLETE ALL THE BEHAVIORAL HEALTH SCREENINGS?: YES

## 2025-06-23 SDOH — SOCIAL STABILITY: SOCIAL INSECURITY: HAS ANYONE EVER THREATENED TO HURT YOUR FAMILY OR YOUR PETS?: NO

## 2025-06-23 SDOH — SOCIAL STABILITY: SOCIAL INSECURITY: ARE THERE ANY APPARENT SIGNS OF INJURIES/BEHAVIORS THAT COULD BE RELATED TO ABUSE/NEGLECT?: NO

## 2025-06-23 SDOH — SOCIAL STABILITY: SOCIAL INSECURITY: DO YOU FEEL ANYONE HAS EXPLOITED OR TAKEN ADVANTAGE OF YOU FINANCIALLY OR OF YOUR PERSONAL PROPERTY?: NO

## 2025-06-23 SDOH — ECONOMIC STABILITY: FOOD INSECURITY: HOW HARD IS IT FOR YOU TO PAY FOR THE VERY BASICS LIKE FOOD, HOUSING, MEDICAL CARE, AND HEATING?: NOT HARD AT ALL

## 2025-06-23 SDOH — SOCIAL STABILITY: SOCIAL INSECURITY: DOES ANYONE TRY TO KEEP YOU FROM HAVING/CONTACTING OTHER FRIENDS OR DOING THINGS OUTSIDE YOUR HOME?: NO

## 2025-06-23 SDOH — SOCIAL STABILITY: SOCIAL INSECURITY: ABUSE: ADULT

## 2025-06-23 ASSESSMENT — ACTIVITIES OF DAILY LIVING (ADL)
LACK_OF_TRANSPORTATION: NO
DRESSING YOURSELF: INDEPENDENT
LACK_OF_TRANSPORTATION: NO
BATHING: INDEPENDENT
HEARING - RIGHT EAR: FUNCTIONAL
JUDGMENT_ADEQUATE_SAFELY_COMPLETE_DAILY_ACTIVITIES: YES
TOILETING: INDEPENDENT
PATIENT'S MEMORY ADEQUATE TO SAFELY COMPLETE DAILY ACTIVITIES?: YES
HEARING - LEFT EAR: FUNCTIONAL
WALKS IN HOME: INDEPENDENT
GROOMING: INDEPENDENT
ADEQUATE_TO_COMPLETE_ADL: YES
FEEDING YOURSELF: INDEPENDENT

## 2025-06-23 ASSESSMENT — COGNITIVE AND FUNCTIONAL STATUS - GENERAL
CLIMB 3 TO 5 STEPS WITH RAILING: A LITTLE
WALKING IN HOSPITAL ROOM: A LITTLE
STANDING UP FROM CHAIR USING ARMS: A LITTLE
TURNING FROM BACK TO SIDE WHILE IN FLAT BAD: A LITTLE
DAILY ACTIVITIY SCORE: 23
CLIMB 3 TO 5 STEPS WITH RAILING: A LITTLE
WALKING IN HOSPITAL ROOM: A LITTLE
STANDING UP FROM CHAIR USING ARMS: A LITTLE
MOBILITY SCORE: 18
MOVING FROM LYING ON BACK TO SITTING ON SIDE OF FLAT BED WITH BEDRAILS: A LITTLE
DAILY ACTIVITIY SCORE: 23
MOBILITY SCORE: 18
DRESSING REGULAR LOWER BODY CLOTHING: A LITTLE
MOVING TO AND FROM BED TO CHAIR: A LITTLE
MOVING TO AND FROM BED TO CHAIR: A LITTLE
PATIENT BASELINE BEDBOUND: NO
MOVING FROM LYING ON BACK TO SITTING ON SIDE OF FLAT BED WITH BEDRAILS: A LITTLE
TURNING FROM BACK TO SIDE WHILE IN FLAT BAD: A LITTLE
DRESSING REGULAR LOWER BODY CLOTHING: A LITTLE

## 2025-06-23 ASSESSMENT — PAIN DESCRIPTION - DESCRIPTORS
DESCRIPTORS: ACHING
DESCRIPTORS: PRESSURE
DESCRIPTORS: PRESSURE

## 2025-06-23 ASSESSMENT — PAIN SCALES - GENERAL
PAINLEVEL_OUTOF10: 10 - WORST POSSIBLE PAIN
PAINLEVEL_OUTOF10: 0 - NO PAIN
PAINLEVEL_OUTOF10: 4
PAINLEVEL_OUTOF10: 0 - NO PAIN
PAINLEVEL_OUTOF10: 4

## 2025-06-23 ASSESSMENT — PAIN - FUNCTIONAL ASSESSMENT
PAIN_FUNCTIONAL_ASSESSMENT: 0-10
PAIN_FUNCTIONAL_ASSESSMENT: UNABLE TO SELF-REPORT
PAIN_FUNCTIONAL_ASSESSMENT: UNABLE TO SELF-REPORT
PAIN_FUNCTIONAL_ASSESSMENT: 0-10
PAIN_FUNCTIONAL_ASSESSMENT: 0-10

## 2025-06-23 ASSESSMENT — LIFESTYLE VARIABLES
HOW OFTEN DO YOU HAVE A DRINK CONTAINING ALCOHOL: MONTHLY OR LESS
SKIP TO QUESTIONS 9-10: 0
HOW OFTEN DO YOU HAVE 6 OR MORE DRINKS ON ONE OCCASION: LESS THAN MONTHLY
AUDIT-C TOTAL SCORE: 2
AUDIT-C TOTAL SCORE: 2
HOW MANY STANDARD DRINKS CONTAINING ALCOHOL DO YOU HAVE ON A TYPICAL DAY: 1 OR 2

## 2025-06-23 ASSESSMENT — COLUMBIA-SUICIDE SEVERITY RATING SCALE - C-SSRS
1. IN THE PAST MONTH, HAVE YOU WISHED YOU WERE DEAD OR WISHED YOU COULD GO TO SLEEP AND NOT WAKE UP?: NO
2. HAVE YOU ACTUALLY HAD ANY THOUGHTS OF KILLING YOURSELF?: NO
6. HAVE YOU EVER DONE ANYTHING, STARTED TO DO ANYTHING, OR PREPARED TO DO ANYTHING TO END YOUR LIFE?: NO

## 2025-06-23 ASSESSMENT — PATIENT HEALTH QUESTIONNAIRE - PHQ9
SUM OF ALL RESPONSES TO PHQ9 QUESTIONS 1 & 2: 0
1. LITTLE INTEREST OR PLEASURE IN DOING THINGS: NOT AT ALL
2. FEELING DOWN, DEPRESSED OR HOPELESS: NOT AT ALL

## 2025-06-23 ASSESSMENT — PAIN DESCRIPTION - LOCATION: LOCATION: ABDOMEN

## 2025-06-23 NOTE — CARE PLAN
The clinical goals for the shift include pain management    Over the shift, the patient did not make progress toward the following goals. Barriers to progression include drowsiness  from surgical anesthesia.   Problem: Pain - Adult  Goal: Verbalizes/displays adequate comfort level or baseline comfort level  Outcome: Progressing     Problem: Safety - Adult  Goal: Free from fall injury  Outcome: Progressing     Problem: Discharge Planning  Goal: Discharge to home or other facility with appropriate resources  Outcome: Progressing     Problem: Chronic Conditions and Co-morbidities  Goal: Patient's chronic conditions and co-morbidity symptoms are monitored and maintained or improved  Outcome: Progressing     Problem: Nutrition  Goal: Nutrient intake appropriate for maintaining nutritional needs  Outcome: Progressing

## 2025-06-23 NOTE — PERIOPERATIVE NURSING NOTE
1235-patient in phase one  1245- patient waking up denies pain switched to nasal canula   1303- messaged Pippa ALBERTS about oozing port site  1307- Pippa ALBERTS at bedside and applied more glue to port site at umbilicus

## 2025-06-23 NOTE — OP NOTE
Robot Assisted Prostatectomy; Lysis of Adhesions; Bilateral Pelvic Lymph Node Dissection Operative Note     Date: 2025  OR Location: [facility] A OR    Name: Adryan Perez, : 1975, Age: 50 y.o., MRN: [id number], Sex: male    Diagnosis  Pre-op Diagnosis      * Elevated PSA [R97.20] Post-op Diagnosis     * Elevated PSA [R97.20]     Procedures  Robot Assisted Prostatectomy; Lysis of Adhesions; Bilateral Pelvic Lymph Node Dissection  49234 - TN LAPS SURG [id number] RPBIC RAD W/NRV SPARING ROBOT    Robot Assisted Prostatectomy; Lysis of Adhesions; Bilateral Pelvic Lymph Node Dissection  76434 - TN LAPS BI TOT PEL LMPHADEC & YOHANNES-AORTIC LYMPH BX 1    TN LAPAROSCOPY URETHRAL SUSPENSION STRESS INCONT [13923]  TN LAPS SURG BILATERAL TOTAL PELVIC LMPHADECTOMY [36732]  TN LAPS SURG [id number] RPBIC RAD W/NRV SPARING ROBOT [43740]  Surgeons      * Ricky Quinteros - Sparkle    Resident/Fellow/Other Assistant:  Surgeons and Role:     * Ronny Mcduffie MD - Resident - Assisting     * Pippa Owens PA-C - S[facility] First Assist    Staff:   Circulator: Landy  Scrub Person: Ned  Circulator: Hellen  Relief Scrub: Radha  Relief Circulator: Aliya  Relief Scrub: Dulce    Anesthesia Staff: Anesthesiologist: Micheline Richardson MD  CRNA: ADE Toscano-CRNA    Procedure Summary  Anesthesia: General  ASA: III  Estimated Blood Loss: 50mL  Intra-op Medications:   Administrations occurring from 0705 to 1105 on 25:   Medication Name Total Dose   sterile water irrigation solution 500 mL   sodium chloride 0.9 % irrigation solution 2,000 mL   BUPivacaine HCl (Marcaine) 0.5 % (5 mg/mL) injection 26 mL   dexAMETHasone (Decadron) injection 4 mg   sodium bicarbonate injection 3 mEq   Unable to Find 10 mL   ceFAZolin (Ancef) vial 1 g 3 g   esmolol (Brevibloc) injection 30 mg   fentaNYL PF 0.05 mg/mL 100 mcg   heparin injection 5,000 units/mL 5,000 Units   HYDROmorphone (Dilaudid) injection 1 mg/mL 0.2 mg   LR bolus Cannot be calculated    lidocaine PF (Xylocaine-MPF) local injection 2 % 100 mg   midazolam PF (Versed) injection 1 mg/mL 2 mg   propofol (Diprivan) injection 10 mg/mL 150 mg   rocuronium (ZeMuron) 50 mg/5 mL injection 110 mg              Anesthesia Record               Intraprocedure I/O Totals          Intake    LR bolus 1000.00 mL    Total Intake 1000 mL       Output    Est. Blood Loss 200 mL    Total Output 200 mL       Net    Net Volume 800 mL          Specimen:   ID Type Source Tests Collected by Time   1 : PERIPROSTATIC FAT Tissue ADIPOSE TISSUE SURGICAL PATHOLOGY EXAM Ricky Quinteros MD 6/23/2025 0937   2 : PROSTATE Tissue PROSTATE RADICAL PROSTATECTOMY SURGICAL PATHOLOGY EXAM Ricky Quinteros MD 6/23/2025 0943   3 : RIGHT PELVIC LYMPH NODE Tissue PELVIC LYMPH NODE DISSECTION RIGHT SURGICAL PATHOLOGY EXAM Ricky Quinteros MD 6/23/2025 1102   4 : LEFT PELVIC LYMPH NODE Tissue PELVIC LYMPH NODE DISSECTION LEFT SURGICAL PATHOLOGY EXAM Ricky Quinteros MD 6/23/2025 1113                 Drains and/or Catheters:   Urethral Catheter Latex 18 Fr. (Active)   Site Assessment Clean;Skin intact 06/23/25 1235   Collection Container Standard drainage bag 06/23/25 1235   Securement Method Securing device (Describe) 06/23/25 1235         Findings: Uncomplicated bilateral nerve sparing RALP with bilateral pelvic LN dissection. Negative leak test with 150 ml.    Indications: Adryan Perez is an 50 y.o. male who is having surgery for Elevated PSA [R97.20].     The patient was seen in the preoperative area. The risks, benefits, complications, treatment options, non-operative alternatives, expected recovery and outcomes were discussed with the patient. The possibilities of reaction to medication, pulmonary aspiration, injury to surrounding structures, bleeding, recurrent infection, the need for additional procedures, failure to diagnose a condition, and creating a complication requiring transfusion or operation were discussed with the patient. The patient  concurred with the proposed plan, giving informed consent.  The site of surgery was properly noted/marked if necessary per policy. The patient has been actively warmed in preoperative area. Preoperative antibiotics have been ordered and given within 1 hours of incision. Venous thrombosis prophylaxis have been ordered including bilateral sequential compression devices and chemical prophylaxis    Procedure Details:   The patient was taken to the operating room and positioned in supine position. General anesthesia was induced smoothly. The patient was then shaved, prepped, and draped in usual sterile fashion, secured to the bed with the Pink Pad as well as chest strap.  A midline incision was made above the umbilicus with Bovie cautery, and intraperitoneal access was obtained using a Veress needle using the drop test to confirm placement.  The abdomen was insufflated with CO2 to a pressure of 15.  We placed an 8 robot port in the midline and inspected his abdomen which revealed no abnormalities. One 8mm robot port was placed on the right side of the abdomen, an additional two 8mm robot ports placed in the left with a 12 mm AirSeal port placed as an assistant port on the right side. A naomi rizvi was used to place an 0-vicryl suture around the 12mm air seal port, which was tagged for closure at the conclusion of the case.  A 5mm second assistant port was placed. The patient was placed in steep Trendelenburg position, and Some adhesion were taken down on the right side. the robot was docked.       We identified the vas deferens bilaterally to start our posterior dissection. We started the posterior dissection by incising the peritoneum behind the prostate.  The bilateral vas were identified, dissected out and divided with cautery.  The seminal vesicles were then dissected out in their entirety using sharp dissection. We entered Denonvilliers fascia sharply and developed the space between the rectum and the prostate  bluntly toward the apex of the prostate.      The bladder was then dropped in the usual fashion. The bladder neck was then divided with cautery on the anterior portion, and then the posterior bladder neck was entered, was divided until the posterior plane previously dissected was identified.  The vas and seminal vesicles were then pulled anteriorly, and a bilateral nerve-sparing procedure was then performed with the neurovascular bundles dropped laterally.  The pedicles were then taken with Hem-o-lui clips with minimal use of cautery throughout. The urethra was divided with scissors and the apex of the prostate dissected free with the prostate specimen then placed in a specimen bag. The dorsal venous complex was then suture ligated with a 2-0 V-Loc suture.       The lymph node dissection was then performed at this point with all fatty lymphatic tissue taken that was medial to the external iliac vein down to the obturator nerve which was spared.        The bladder neck was reconstructed using 2-0 Vicryl. A Lauri stitch was performed using a 2-0 V-loc. The vesicourethral anastomosis was then performed with two running 3-0 monocryl sutures. An 18-Guamanian Castaneda catheter was placed after the anastomosis was completed and balloon filled with 10 mL of sterile water. A leak test was performed with 150 ml of normal saline, and the anastomosis appeared watertight, therefore the decision was made to not leave a drain. The robot was then undocked and the ports removed.       The specimen was removed through a midline incision which was extended superiorly with the Bovie. The fascia at this site was then closed with figure eight 0-Vicryl sutures. The assistant port site with previously placed 0-vicryl was tied down and fascia was well approximated.  The skin incisions were then closed with running subcuticular 4-0 Monocryl, and Dermabond was applied.  The patient was then awoken from anesthesia and taken to PACU in good  condition.     Evidence of Infection: No   Complications:  None; patient tolerated the procedure well.    Disposition: PACU - hemodynamically stable.  Condition: stable       Task Performed by S[facility] First Assist or Physician Assistant:   LEXUS Hines, was necessary to assist on this case due to the nature of the case and difficulty. During the case  LEXUS Hines,  served as my assist      Attending Attestation: I performed the procedure.    Ricky Quinteros  Phone Number: [phone]

## 2025-06-23 NOTE — ANESTHESIA PROCEDURE NOTES
Airway  Date/Time: 6/23/2025 8:02 AM  Reason: elective    Airway not difficult    Staffing  Performed: CRNA   Authorized by: Micheline Richardson MD    Performed by: ADE Toscano-CHINO  Patient location during procedure: OR    Patient Condition  Indications for airway management: anesthesia  Patient position: sniffing  Sedation level: deep     Final Airway Details   Preoxygenated: yes  Final airway type: endotracheal airway  Successful airway: ETT   Successful intubation technique: video laryngoscopy  Adjuncts used in placement: intubating stylet  Endotracheal tube insertion site: oral  Blade: Harrison  Blade size: #4  ETT size (mm): 7.0  Cormack-Lehane Classification: grade IIa - partial view of glottis  Placement verified by: capnometry   Number of attempts at approach: 1    Additional Comments  Large amount of secretions and soft tissue in oropharynx.  Intubated with Benavidez without difficulty.  Tooth #9, loose and bled easily, remained intact

## 2025-06-23 NOTE — ANESTHESIA POSTPROCEDURE EVALUATION
Patient: Adryan Perez    Procedure Summary       Date: 06/23/25 Room / Location: [facility] A OR 01 / [facility] A OR    Anesthesia Start: 0746 Anesthesia Stop: 1241    Procedure: Robot Assisted Prostatectomy; Lysis of Adhesions; Bilateral Pelvic Lymph Node Dissection (Abdomen) Diagnosis:       Elevated PSA      (Elevated PSA [R97.20])    Surgeons: Ricky Quinteros MD Responsible Provider: Micheline Richardson MD    Anesthesia Type: general ASA Status: 3            Anesthesia Type: general    Vitals Value Taken Time   /74 06/23/25 13:00   Temp 36.2 °C (97.2 °F) 06/23/25 12:35   Pulse 94 06/23/25 13:00   Resp 18 06/23/25 13:00   SpO2 93 % 06/23/25 13:00       Anesthesia Post Evaluation    Patient location during evaluation: PACU  Patient participation: complete - patient participated  Level of consciousness: awake  Pain management: adequate  Airway patency: patent  Cardiovascular status: acceptable  Respiratory status: acceptable  Hydration status: acceptable  Postoperative Nausea and Vomiting: none        No notable events documented.

## 2025-06-23 NOTE — ANESTHESIA PREPROCEDURE EVALUATION
Patient: Adryan Perez    Procedure Information       Date/Time: 06/23/25 0705    Procedure: Robot Assisted Prostatectomy (Abdomen)    Location: [facility] A OR 01 / Virtual [facility] A OR    Surgeons: Ricky Quinteros MD            Relevant Problems   Cardiac   (+) Benign essential hypertension   (+) Hyperlipidemia      /Renal   (+) Malignant neoplasm of prostate (Multi)       Clinical information reviewed:   Tobacco  Allergies  Meds   Med Hx  Surg Hx   Fam Hx  Soc Hx        NPO Detail:  NPO/Void Status  Carbohydrate Drink Given Prior to Surgery? : N  Date of Last Liquid: 06/23/25  Time of Last Liquid: 0230  Date of Last Solid: 06/22/25  Time of Last Solid: 2100  Last Intake Type: Clear fluids  Time of Last Void: 0612         Physical Exam    Airway  Mallampati: IV  TM distance: >3 FB  Neck ROM: full  Mouth opening: 3 or more finger widths     Cardiovascular   Rhythm: regular  Rate: normal     Dental    Pulmonary Breath sounds clear to auscultation     Abdominal      Other findings: Partial upper denture        Anesthesia Plan    History of general anesthesia?: yes  History of complications of general anesthesia?: no    ASA 3     general     The patient is not a current smoker.  Patient was previously instructed to abstain from smoking on day of procedure.  Patient did not smoke on day of procedure.    intravenous induction   Postoperative pain plan includes opioids.  Trial extubation is planned.  Anesthetic plan and risks discussed with patient.  Use of blood products discussed with patient who consented to blood products.

## 2025-06-24 ENCOUNTER — HOSPITAL ENCOUNTER (EMERGENCY)
Facility: HOSPITAL | Age: 50
Discharge: HOME | End: 2025-06-24
Attending: EMERGENCY MEDICINE
Payer: COMMERCIAL

## 2025-06-24 ENCOUNTER — PHARMACY VISIT (OUTPATIENT)
Dept: PHARMACY | Facility: CLINIC | Age: 50
End: 2025-06-24
Payer: COMMERCIAL

## 2025-06-24 ENCOUNTER — APPOINTMENT (OUTPATIENT)
Dept: RADIOLOGY | Facility: HOSPITAL | Age: 50
End: 2025-06-24
Payer: COMMERCIAL

## 2025-06-24 VITALS
OXYGEN SATURATION: 94 % | SYSTOLIC BLOOD PRESSURE: 122 MMHG | BODY MASS INDEX: 37.43 KG/M2 | HEIGHT: 70 IN | WEIGHT: 261.47 LBS | DIASTOLIC BLOOD PRESSURE: 79 MMHG | HEART RATE: 101 BPM | RESPIRATION RATE: 17 BRPM | TEMPERATURE: 99.1 F

## 2025-06-24 VITALS
OXYGEN SATURATION: 94 % | SYSTOLIC BLOOD PRESSURE: 150 MMHG | DIASTOLIC BLOOD PRESSURE: 103 MMHG | HEART RATE: 90 BPM | TEMPERATURE: 97.3 F | RESPIRATION RATE: 18 BRPM

## 2025-06-24 DIAGNOSIS — L76.82 POSTOPERATIVE COMPLICATION OF SKIN INVOLVING DRAINAGE FROM SURGICAL WOUND: Primary | ICD-10-CM

## 2025-06-24 LAB
ALBUMIN SERPL BCP-MCNC: 4.2 G/DL (ref 3.4–5)
ALP SERPL-CCNC: 74 U/L (ref 33–120)
ALT SERPL W P-5'-P-CCNC: 17 U/L (ref 10–52)
ANION GAP SERPL CALC-SCNC: 10 MMOL/L (ref 10–20)
ANION GAP SERPL CALC-SCNC: 11 MMOL/L (ref 10–20)
APTT PPP: 25 SECONDS (ref 26–36)
AST SERPL W P-5'-P-CCNC: 14 U/L (ref 9–39)
BASOPHILS # BLD AUTO: 0.09 X10*3/UL (ref 0–0.1)
BASOPHILS NFR BLD AUTO: 1.2 %
BILIRUB SERPL-MCNC: 0.7 MG/DL (ref 0–1.2)
BUN SERPL-MCNC: 13 MG/DL (ref 6–23)
BUN SERPL-MCNC: 15 MG/DL (ref 6–23)
CALCIUM SERPL-MCNC: 8.1 MG/DL (ref 8.6–10.3)
CALCIUM SERPL-MCNC: 8.6 MG/DL (ref 8.6–10.3)
CHLORIDE SERPL-SCNC: 103 MMOL/L (ref 98–107)
CHLORIDE SERPL-SCNC: 105 MMOL/L (ref 98–107)
CO2 SERPL-SCNC: 26 MMOL/L (ref 21–32)
CO2 SERPL-SCNC: 27 MMOL/L (ref 21–32)
CREAT SERPL-MCNC: 1.06 MG/DL (ref 0.5–1.3)
CREAT SERPL-MCNC: 1.24 MG/DL (ref 0.5–1.3)
EGFRCR SERPLBLD CKD-EPI 2021: 70 ML/MIN/1.73M*2
EGFRCR SERPLBLD CKD-EPI 2021: 84 ML/MIN/1.73M*2
EOSINOPHIL # BLD AUTO: 0.07 X10*3/UL (ref 0–0.7)
EOSINOPHIL NFR BLD AUTO: 0.9 %
ERYTHROCYTE [DISTWIDTH] IN BLOOD BY AUTOMATED COUNT: 13 % (ref 11.5–14.5)
ERYTHROCYTE [DISTWIDTH] IN BLOOD BY AUTOMATED COUNT: 13.1 % (ref 11.5–14.5)
GLUCOSE SERPL-MCNC: 111 MG/DL (ref 74–99)
GLUCOSE SERPL-MCNC: 116 MG/DL (ref 74–99)
HCT VFR BLD AUTO: 39.2 % (ref 41–52)
HCT VFR BLD AUTO: 41.6 % (ref 41–52)
HGB BLD-MCNC: 13 G/DL (ref 13.5–17.5)
HGB BLD-MCNC: 13.2 G/DL (ref 13.5–17.5)
IMM GRANULOCYTES # BLD AUTO: 0.02 X10*3/UL (ref 0–0.7)
IMM GRANULOCYTES NFR BLD AUTO: 0.3 % (ref 0–0.9)
INR PPP: 1 (ref 0.9–1.1)
LACTATE SERPL-SCNC: 0.8 MMOL/L (ref 0.4–2)
LYMPHOCYTES # BLD AUTO: 1.52 X10*3/UL (ref 1.2–4.8)
LYMPHOCYTES NFR BLD AUTO: 20.1 %
MCH RBC QN AUTO: 31.1 PG (ref 26–34)
MCH RBC QN AUTO: 31.3 PG (ref 26–34)
MCHC RBC AUTO-ENTMCNC: 31.7 G/DL (ref 32–36)
MCHC RBC AUTO-ENTMCNC: 33.2 G/DL (ref 32–36)
MCV RBC AUTO: 94 FL (ref 80–100)
MCV RBC AUTO: 99 FL (ref 80–100)
MONOCYTES # BLD AUTO: 1.07 X10*3/UL (ref 0.1–1)
MONOCYTES NFR BLD AUTO: 14.1 %
NEUTROPHILS # BLD AUTO: 4.8 X10*3/UL (ref 1.2–7.7)
NEUTROPHILS NFR BLD AUTO: 63.4 %
NRBC BLD-RTO: 0 /100 WBCS (ref 0–0)
NRBC BLD-RTO: 0 /100 WBCS (ref 0–0)
PLATELET # BLD AUTO: 219 X10*3/UL (ref 150–450)
PLATELET # BLD AUTO: 220 X10*3/UL (ref 150–450)
POTASSIUM SERPL-SCNC: 4.1 MMOL/L (ref 3.5–5.3)
POTASSIUM SERPL-SCNC: 4.1 MMOL/L (ref 3.5–5.3)
PROT SERPL-MCNC: 6.7 G/DL (ref 6.4–8.2)
PROTHROMBIN TIME: 11.4 SECONDS (ref 9.8–12.4)
RBC # BLD AUTO: 4.18 X10*6/UL (ref 4.5–5.9)
RBC # BLD AUTO: 4.22 X10*6/UL (ref 4.5–5.9)
SODIUM SERPL-SCNC: 137 MMOL/L (ref 136–145)
SODIUM SERPL-SCNC: 137 MMOL/L (ref 136–145)
WBC # BLD AUTO: 7.6 X10*3/UL (ref 4.4–11.3)
WBC # BLD AUTO: 9.7 X10*3/UL (ref 4.4–11.3)

## 2025-06-24 PROCEDURE — 2500000001 HC RX 250 WO HCPCS SELF ADMINISTERED DRUGS (ALT 637 FOR MEDICARE OP): Performed by: NURSE PRACTITIONER

## 2025-06-24 PROCEDURE — 2500000004 HC RX 250 GENERAL PHARMACY W/ HCPCS (ALT 636 FOR OP/ED): Mod: JZ

## 2025-06-24 PROCEDURE — 99285 EMERGENCY DEPT VISIT HI MDM: CPT | Mod: 25 | Performed by: EMERGENCY MEDICINE

## 2025-06-24 PROCEDURE — 36415 COLL VENOUS BLD VENIPUNCTURE: CPT | Performed by: NURSE PRACTITIONER

## 2025-06-24 PROCEDURE — 80048 BASIC METABOLIC PNL TOTAL CA: CPT

## 2025-06-24 PROCEDURE — 85025 COMPLETE CBC W/AUTO DIFF WBC: CPT

## 2025-06-24 PROCEDURE — 85027 COMPLETE CBC AUTOMATED: CPT | Performed by: NURSE PRACTITIONER

## 2025-06-24 PROCEDURE — 80048 BASIC METABOLIC PNL TOTAL CA: CPT | Performed by: NURSE PRACTITIONER

## 2025-06-24 PROCEDURE — 99221 1ST HOSP IP/OBS SF/LOW 40: CPT

## 2025-06-24 PROCEDURE — 2550000001 HC RX 255 CONTRASTS

## 2025-06-24 PROCEDURE — 2500000001 HC RX 250 WO HCPCS SELF ADMINISTERED DRUGS (ALT 637 FOR MEDICARE OP)

## 2025-06-24 PROCEDURE — 2500000005 HC RX 250 GENERAL PHARMACY W/O HCPCS

## 2025-06-24 PROCEDURE — 83605 ASSAY OF LACTIC ACID: CPT

## 2025-06-24 PROCEDURE — 36415 COLL VENOUS BLD VENIPUNCTURE: CPT

## 2025-06-24 PROCEDURE — RXMED WILLOW AMBULATORY MEDICATION CHARGE

## 2025-06-24 PROCEDURE — 85610 PROTHROMBIN TIME: CPT

## 2025-06-24 PROCEDURE — 80053 COMPREHEN METABOLIC PANEL: CPT | Performed by: NURSE PRACTITIONER

## 2025-06-24 PROCEDURE — 74177 CT ABD & PELVIS W/CONTRAST: CPT

## 2025-06-24 PROCEDURE — 74177 CT ABD & PELVIS W/CONTRAST: CPT | Performed by: RADIOLOGY

## 2025-06-24 RX ORDER — TRAMADOL HYDROCHLORIDE 50 MG/1
50 TABLET, FILM COATED ORAL ONCE
Status: COMPLETED | OUTPATIENT
Start: 2025-06-24 | End: 2025-06-24

## 2025-06-24 RX ORDER — ACETAMINOPHEN 325 MG/1
650 TABLET ORAL EVERY 6 HOURS PRN
Qty: 30 TABLET | Refills: 0 | Status: SHIPPED | OUTPATIENT
Start: 2025-06-24

## 2025-06-24 RX ORDER — IBUPROFEN 600 MG/1
600 TABLET, FILM COATED ORAL EVERY 6 HOURS PRN
Qty: 30 TABLET | Refills: 0 | Status: SHIPPED | OUTPATIENT
Start: 2025-06-24

## 2025-06-24 RX ADMIN — KETOROLAC TROMETHAMINE 30 MG: 30 INJECTION, SOLUTION INTRAMUSCULAR at 06:03

## 2025-06-24 RX ADMIN — METHOCARBAMOL 750 MG: 750 TABLET ORAL at 05:46

## 2025-06-24 RX ADMIN — HEPARIN SODIUM 5000 UNITS: 5000 INJECTION, SOLUTION INTRAVENOUS; SUBCUTANEOUS at 05:46

## 2025-06-24 RX ADMIN — ACETAMINOPHEN 975 MG: 325 TABLET, FILM COATED ORAL at 06:02

## 2025-06-24 RX ADMIN — SODIUM CHLORIDE, POTASSIUM CHLORIDE, SODIUM LACTATE AND CALCIUM CHLORIDE 100 ML/HR: 600; 310; 30; 20 INJECTION, SOLUTION INTRAVENOUS at 06:01

## 2025-06-24 RX ADMIN — IOHEXOL 75 ML: 350 INJECTION, SOLUTION INTRAVENOUS at 19:28

## 2025-06-24 RX ADMIN — SENNOSIDES AND DOCUSATE SODIUM 2 TABLET: 50; 8.6 TABLET ORAL at 08:36

## 2025-06-24 RX ADMIN — LIDOCAINE 4% 2 PATCH: 40 PATCH TOPICAL at 08:51

## 2025-06-24 RX ADMIN — TRAMADOL HYDROCHLORIDE 50 MG: 50 TABLET, COATED ORAL at 00:34

## 2025-06-24 RX ADMIN — PREGABALIN 50 MG: 50 CAPSULE ORAL at 08:36

## 2025-06-24 RX ADMIN — POLYETHYLENE GLYCOL 3350 17 G: 17 POWDER, FOR SOLUTION ORAL at 08:36

## 2025-06-24 ASSESSMENT — PAIN SCALES - GENERAL
PAINLEVEL_OUTOF10: 0 - NO PAIN
PAINLEVEL_OUTOF10: 5 - MODERATE PAIN
PAINLEVEL_OUTOF10: 7
PAINLEVEL_OUTOF10: 0 - NO PAIN

## 2025-06-24 ASSESSMENT — COGNITIVE AND FUNCTIONAL STATUS - GENERAL
MOBILITY SCORE: 18
TURNING FROM BACK TO SIDE WHILE IN FLAT BAD: A LITTLE
MOVING FROM LYING ON BACK TO SITTING ON SIDE OF FLAT BED WITH BEDRAILS: A LITTLE
DRESSING REGULAR LOWER BODY CLOTHING: A LITTLE
MOVING TO AND FROM BED TO CHAIR: A LITTLE
CLIMB 3 TO 5 STEPS WITH RAILING: A LITTLE
WALKING IN HOSPITAL ROOM: A LITTLE
STANDING UP FROM CHAIR USING ARMS: A LITTLE
DAILY ACTIVITIY SCORE: 23

## 2025-06-24 ASSESSMENT — PAIN DESCRIPTION - LOCATION
LOCATION: ABDOMEN
LOCATION: ABDOMEN

## 2025-06-24 ASSESSMENT — PAIN DESCRIPTION - DESCRIPTORS: DESCRIPTORS: ACHING;PRESSURE

## 2025-06-24 ASSESSMENT — PAIN - FUNCTIONAL ASSESSMENT
PAIN_FUNCTIONAL_ASSESSMENT: 0-10

## 2025-06-24 NOTE — DISCHARGE SUMMARY
Discharge Diagnosis  Elevated PSA    Issues Requiring Follow-Up  Post op Visit for mooney removal an TOV    Test Results Pending At Discharge  Pending Labs       Order Current Status    Surgical Pathology Exam In process            Hospital Course   50 yr old male with Prostate cancer s/p Robot Assisted Prostatectomy; Lysis of Adhesions; Bilateral Pelvic Lymph Node Dissection on 6/23/25 with Dr. Quinteros. Please see operative report for full details. Patient tolerated the procedure well and recovered briefly in PACU before being transitioned to regular nursing floor. Post-op course was uncomplicated. Diet was advanced as tolerated.  IV medication transitioned to oral as diet advanced. On the day of discharge, the pt was tolerating a diet, pain was controlled on PO pain medication, and they were ambulating and voiding spontaneously. They were discharged to home in stable condition with instructions to follow up as outpatient.     Visit Vitals  BP (!) 148/93 (BP Location: Right arm, Patient Position: Lying)   Pulse 110   Temp 37.3 °C (99.1 °F) (Temporal)   Resp 18     Vitals:    06/23/25 0615   Weight: 119 kg (261 lb 7.5 oz)       Immunization History   Administered Date(s) Administered    Tdap vaccine, age 7 year and older (BOOSTRIX, ADACEL) 01/14/2014       Results  Scheduled medications  Scheduled Medications[1]  Continuous medications  Continuous Medications[2]  PRN medications  PRN Medications[3]   Results for orders placed or performed during the hospital encounter of 06/23/25 (from the past 24 hours)   Basic metabolic panel   Result Value Ref Range    Glucose 116 (H) 74 - 99 mg/dL    Sodium 137 136 - 145 mmol/L    Potassium 4.1 3.5 - 5.3 mmol/L    Chloride 105 98 - 107 mmol/L    Bicarbonate 26 21 - 32 mmol/L    Anion Gap 10 10 - 20 mmol/L    Urea Nitrogen 13 6 - 23 mg/dL    Creatinine 1.06 0.50 - 1.30 mg/dL    eGFR 84 >60 mL/min/1.73m*2    Calcium 8.1 (L) 8.6 - 10.3 mg/dL   CBC   Result Value Ref Range    WBC 9.7  4.4 - 11.3 x10*3/uL    nRBC 0.0 0.0 - 0.0 /100 WBCs    RBC 4.18 (L) 4.50 - 5.90 x10*6/uL    Hemoglobin 13.0 (L) 13.5 - 17.5 g/dL    Hematocrit 39.2 (L) 41.0 - 52.0 %    MCV 94 80 - 100 fL    MCH 31.1 26.0 - 34.0 pg    MCHC 33.2 32.0 - 36.0 g/dL    RDW 13.1 11.5 - 14.5 %    Platelets 220 150 - 450 x10*3/uL      No orders to display          Pertinent Physical Exam At Time of Discharge  Physical Exam  Constitutional:       Appearance: Normal appearance.   Cardiovascular:      Rate and Rhythm: Normal rate and regular rhythm.   Pulmonary:      Effort: Pulmonary effort is normal.      Comments: Non labored breathing on RA  Abdominal:      General: There is distension.      Palpations: Abdomen is soft.      Tenderness: There is no abdominal tenderness.      Comments: Lap sites C/D/I, edges well approximated, covered in Dermabond.   Genitourinary:     Comments: Castaneda in place draining light pink urine  Skin:     General: Skin is warm and dry.   Neurological:      General: No focal deficit present.      Mental Status: He is alert and oriented to person, place, and time. Mental status is at baseline.   Psychiatric:         Attention and Perception: Attention normal.         Mood and Affect: Mood normal.         Speech: Speech normal.         Behavior: Behavior normal.         Cognition and Memory: Cognition normal.         Home Medications     Medication List      START taking these medications     acetaminophen 325 mg tablet; Commonly known as: Tylenol; Take 2 tablets   (650 mg) by mouth every 6 hours if needed for mild pain (1 - 3).   ibuprofen 600 mg tablet; Take 1 tablet (600 mg) by mouth every 6 hours   if needed for mild pain (1 - 3) for up to 30 doses.     CONTINUE taking these medications     amLODIPine 10 mg tablet; Commonly known as: Norvasc; Take 1 tablet (10   mg) by mouth once daily.   atenoloL-chlorthalidone 50-25 mg tablet; Commonly known as: Tenoretic;   Take 1 tablet by mouth once daily.   multivitamin with  minerals tablet     STOP taking these medications     sodium,potassium,mag sulfates 17.5-3.13-1.6 gram solution; Commonly   known as: [facility]       Outpatient Follow-Up  Future Appointments   Date Time Provider Department Center   7/7/2025  9:40 AM Ricky Quinteros MD [facility]       Liam Cuellar PA-C       [1] acetaminophen, 975 mg, oral, q6h  heparin (porcine), 5,000 Units, subcutaneous, q8h  ketorolac, 30 mg, intravenous, q6h  lidocaine, 2 patch, transdermal, Daily  methocarbamol, 750 mg, oral, q6h ANIVAL  polyethylene glycol, 17 g, oral, Daily  pregabalin, 50 mg, oral, BID  sennosides-docusate sodium, 2 tablet, oral, BID  [2] lactated Ringer's, 100 mL/hr, Last Rate: 100 mL/hr (06/24/25 0650)  [3]

## 2025-06-24 NOTE — PROGRESS NOTES
Pharmacy Medication History     Source of Information: PATIENT    Additional concerns with the patient's PTA list.     Notified Provider via Haiku : Yes    The following updates were made to the Prior to Admission medication list:     Medications ADDED:   N/A  Medications CHANGED:  N/A  Medications REMOVED:   SODIUM, POTASSIUM, MAG SULFATES 17.5- 3.13-1.6 GRAM RECON SOLN  Medications NOT TAKING:   SODIUM, POTASSIUM, MAG SULFATES 17.5- 3.13-1.6 GRAM RECON SOLN    Allergy reviewed : Yes    Meds 2 Beds : Yes    Outpatient pharmacy confirmed and updated in chart : Yes    [facility]    The list below reflectives the updated PTA list. Please review each medication in order reconciliation for additional clarification and justification.    Prior to Admission Medications   Prescriptions Last Dose      amLODIPine (Norvasc) 10 mg tablet 6/23/2025 Morning      Sig: Take 1 tablet (10 mg) by mouth once daily.   atenoloL-chlorthalidone (Tenoretic) 50-25 mg tablet 6/23/2025 Morning      Sig: Take 1 tablet by mouth once daily.   multivitamin with minerals tablet Past Week      Sig: Take 1 tablet by mouth once daily.                   Facility-Administered Medications: None       The list below reflectives the updated allergy list. Please review each documented allergy for additional clarification and justification.    No Known Allergies       06/24/25 at 11:28 AM - Tena Velasco

## 2025-06-24 NOTE — ED TRIAGE NOTES
As provider-in-triage, I performed a medical screening history and physical exam on this patient.  HISTORY OF PRESENT ILLNESS  Patient is a 50-year-old male presenting to the ED with concern for incision site bleeding.  Patient had prostate surgery yesterday was discharged at 11 AM today.  A couple hours prior to arrival he noticed some drainage coming from the incision site.  He does not take blood thinners but as he was given a dose of blood thinners IntraOp.  Denies any fever, chills, weakness, vomiting.  States mild abdominal tenderness around the incision site.     PHYSICAL EXAM  Vital Signs reviewed.  Cardiovascular: Regular rate and rhythm. No m/g/r  Respiratory: No respiratory distress. No accessory muscle use. Breath sounds are present and equal b/l. No adventitious sounds.   Abdomen: Abdomen is soft with no guarding, rigidity, or rebound. No CVA tenderness bilaterally.  Incision site with clear-red drainage.  Tenderness and bogginess surrounding incision site.       MDM  Workup initiated. Pt stable pending bed availability and further evaluation. Please see subsequent provider note for further details and disposition.         I evaluated this patient in triage with the RN. Due to the patients complaint labs and or imaging were ordered by myself in an attempt to expedite patient care however I am not participating in care after evaluation. This is a preliminary assessment. Pt does not appear in acute distress at this time. They will have a full evaluation as soon as possible. They will be cared for by another provider who will possibly order more labs, imaging or interventions. Pt did not have a full ROS or PE completed by myself however above is a summary with reasons for orders.  For the remainder of the patient's workup and ED course, please refer to the main ED provider note. We discussed need for diagnostic testing including laboratory studies and imaging.  We also discussed that patient may be asked  to wait in the waiting room while these tests are pending.  They understand that if they choose to leave without having the testing completed or resulted that we cannot rule out acute life threatening illnesses and the risks involved could lead to worsening condition, permanent disability or even death.

## 2025-06-24 NOTE — POST-PROCEDURE NOTE
Adryan Perez is a 50 y.o. year old male patient POD0 Robot Assisted Prostatectomy; Lysis of Adhesions; Bilateral Pelvic Lymph Node Dissection with Dr. Quinteros (Findings: Uncomplicated bilateral nerve sparing RALP with bilateral pelvic LN dissection. Negative leak test with 150 ml). Pain un controlled. No n/v. Has mooney.    PE:  Constitutional: A&Ox3, calm and cooperative, NAD  Eyes: EOMI, clear sclera   Cardiovascular: Normal rate and regular rhythm. No murmurs  Respiratory/Thorax: CTAB, on RA  Gastrointestinal: Abdomen soft, mildly distended, appropriately tender, lap sites C/D/I with dermabond, well approximated w/o surrounding erythema or drainage. DIANA- sanguinous  Genitourinary: Mooney- clear yellow  Musculoskeletal: ROM intact  Extremities: No peripheral edema  Neurological: A&Ox3, No focal deficits   Psychological: Appropriate mood and behavior    Radiology and labs reviewed    Plan:   - Diet: Regular  - Continue current pain regimen - added ANIVAL Lyrica, one time Tramadol  - PRN antiemetic   - DVT Proph: SCDs/ ambulate/ Heparin SQ   - Bowel regimen: Per-Colace and Miralax   - Monitor VS every 4 hours   - Labs ordered for AM   - IS every hour while awake   - Encourage ambulation / OOB as tolerated   - Instructed on post operative care, s/s of infection  - Monitor lap sites for drainage, erythema, excessive bruising   - Continue supportive care  - Monitor and record DIANA output  - DIANA care    Dispo: Pain control. Maintain mooney.    Total time spent 35 minutes, and greater than 50% of  time was spent in counseling/coordination of care

## 2025-06-24 NOTE — ED TRIAGE NOTES
Patient had prostate surgery yesterday, he was just discharged from upstairs around 1100 today. He is returning to ER for incision site drainage/bleeding.     There is small amounts of clear blood tinged drainage coming from incision site on his abdomen     Patient denies abdominal pain, incision pain, dizziness, shortness of breath, or chest pain.

## 2025-06-24 NOTE — CARE PLAN
The patient's goals for the shift include      The clinical goals for the shift include maintain pt safety    Problem: Pain - Adult  Goal: Verbalizes/displays adequate comfort level or baseline comfort level  Outcome: Progressing     Problem: Safety - Adult  Goal: Free from fall injury  Outcome: Progressing     Problem: Discharge Planning  Goal: Discharge to home or other facility with appropriate resources  Outcome: Progressing     Problem: Chronic Conditions and Co-morbidities  Goal: Patient's chronic conditions and co-morbidity symptoms are monitored and maintained or improved  Outcome: Progressing     Problem: Nutrition  Goal: Nutrient intake appropriate for maintaining nutritional needs  Outcome: Progressing

## 2025-06-24 NOTE — DISCHARGE INSTRUCTIONS
[facility]     DISCHARGE INSTRUCTIONS      Prostatectomy       Call [phone] during regular daytime business hours (8:00 am - 5:00 pm) and after 5:00 pm and ask for the Urology resident with any questions or concerns.     If it is a life-threatening situation, proceed to the nearest emergency department.               Thank you for the opportunity to care for you today.  Your health and healing are very important to us.  We hope we made you feel as comfortable as possible and are committed to your recovery and continued well-being.       The following is a brief overview of your prostatectomy procedure. Some of the information contained on this summary may be confidential.  This information should be kept in your records and should be shared with your regular doctor.     Physicians:   Dr. Ricky Quinteros     Procedure performed: removal of your prostate        What to Expect During your Recovery and Home Care  Anesthesia Side Effects   You may feel sleepy, tired, or have a sore throat.   You may also feel drowsiness, dizziness, or inability to think clearly.  For your safety, do not drive, drink alcoholic beverages, take any unprescribed medication or make any important decisions for 24 hours after anesthesia.  A responsible adult should be with you for 24 hours.        Activity and Recovery    No heavy lifting greater than 10 pounds for the next 4-6 weeks. No driving until mobility has returned to normal. Do not drive or operate heavy machinery while taking narcotic pain medications as these medications can alter perception, impair judgement, and slow reaction times.    Pain Control  Unfortunately, you may experience pain after your procedure. Adequate pain management can include alternative measures to help ease your pain and that can include over the counter Tylenol/ibuprofen or oxycodone which can be taken as prescribed as needed for breakthrough pain. Do not take more than 4,000mg of Tylenol in a  24-hour period.       Your procedure was done robotically so you may experience gas pains from the surgery. Getting up and moving around is the best way to relieve those pains. You can also take some over the counter gas-x(simethicone).     Nausea/Vomiting   Clear liquids are best tolerated at first. Start slow, advance your diet as tolerated to normal foods. Avoid spicy, greasy, heavy foods at first. Also, you may feel nauseous or like you need to vomit if you take any type of medication on an empty stomach.  Call your physician if you are unable to eat or drink, are not passing gas and have persistent vomiting.     Signs of Bleeding   You could have some blood in your urine off and on over the next several weeks. Your urine will be light pink to yellow. Minor bleeding or drainage may occur from the surgical sites; however, excessive or consistent bleeding should be reported to your surgeon. Excessive bleeding is defined as blood that is dripping from wound, soaking you bandages, and is ketchup colored, thick with possible blood clots.  Consistent is defined as bleeding that does not stop.       Treatment/wound care:   Keep area(s) clean and dry.   It is okay to shower 24 hours after time of surgery.    Do not scrub wound(s), pat dry.    Do not submerge wound(s) in standing water until seen for follow up appointment (no tub bathing, swimming, or hot tubs).    Clean with mild soap, gentle washing, pat dry, cover with bandage as needed.    Avoid waterproof bandages.  No oils or lotions on incisions.  Staples/sutures removed in our [id number] days after the date of surgery.  Do not remove the staples/sutures on your own.     Please visually inspect your wound(s) at least once daily.  If the wound(s) are in a difficult to see location, please use a mirror or have someone else assist with visual inspection.     Signs of Infection  Signs of infection can include fever, chills, redness around surgical incisions,  green/yellow drainage from incisions, or severe abdominal pain.  If you see any of these occur, please contact your doctor's office at [phone].  Any fever higher than 100.4, especially if associated with an ill feeling, abdominal pain, chills, or nausea should be reported to your surgeon.       Assist in bowel movements/urination  Take daily stool softener you were prescribed  Increase fiber in diet  Increase water (6 to 8 glasses)  Increase walking   Can try adding over the counter MiraLAX or in extreme cases milk of magnesia.  If you have tried these methods and you are not passing gas, your bladder still feels full and you cannot have a bowel movement, please go to your nearest Emergency room/contact your physician.     Additional Instructions:   Use a small pillow to put pressure on your belly. This can make you more comfortable when you cough, laugh or do other actions.      CATHETER CARE  Always keep the catheters tubing and drainage bag below the level of your bladder.  Avoid loops and kinks in the catheter tubing.  NOTIFY your physician if catheter falls out or catheter seems clogged and urine is not draining.   Do not wear the small leg bag to bed you should be provided with a larger overnight bag that you should wear to bed and can hang over the side of the bed.  We recommend wearing the large bag in the shower as well as this is easy to dry, and you do not get your leg straps wet from your leg bag.   Your catheter should be secured to your upper thigh, do not allow it to hang or dangle.

## 2025-06-24 NOTE — NURSING NOTE

## 2025-06-25 ENCOUNTER — OFFICE VISIT (OUTPATIENT)
Dept: UROLOGY | Facility: HOSPITAL | Age: 50
End: 2025-06-25
Payer: COMMERCIAL

## 2025-06-25 DIAGNOSIS — Z48.89 POSTOPERATIVE VISIT: ICD-10-CM

## 2025-06-25 DIAGNOSIS — R97.20 ELEVATED PSA: Primary | ICD-10-CM

## 2025-06-25 PROCEDURE — 99213 OFFICE O/P EST LOW 20 MIN: CPT | Performed by: NURSE PRACTITIONER

## 2025-06-25 PROCEDURE — 1036F TOBACCO NON-USER: CPT | Performed by: NURSE PRACTITIONER

## 2025-06-25 NOTE — CONSULTS
Reason For Consult  Post-op drainage from wound    History Of Present Illness  Adryan Perez is a 50 y.o. male presenting with drainage from his giovanni-umbilical lap site, which started around 5:45 this evening. He underwent a robot assisted prostatectomy, HAYLEY, and bilateral lymph node dissection with Dr. Quinteros 6/23/25 (Intraoperative Findings: Uncomplicated bilateral nerve sparing RALP with bilateral LN dissection. Negative leak test with 150 mL). An 18 Fr mooney catheter was placed in the OR. Initial in hospital post-op course was uncomplicated and he was discharged home earlier today. Mooney catheter was left in place upon discharge.     He denies any pain, nausea or vomiting. His mooney catheter has been draining appropriately.     He is afebrile, not tachycardic and is hypertensive to 164/113. WBC 7.6, H/H 13.2/41.6, BUN 15, Cr 1.24 (1.06 this AM), GFR 70 (84 this AM), lactate 0.8. CT A/P revealed inflammatory stranding and punctate foci of air in the operative bed. Ill-defined fluid and stranding is noted in the pelvis extending along the pelvic sidewall and retroperitoneal soft tissues just inferior to the level of the lower pole of the kidneys. These findings likely relate to post-surgical seroma/evolving hematoma. Due to this, urology was consulted.      Past Medical History  He has a past medical history of BPPV (benign paroxysmal positional vertigo), Congenital hydrocephalus, unspecified, Elevated PSA, Essential (primary) hypertension, HLD (hyperlipidemia), Old myocardial infarction, and Prediabetes.    Surgical History  He has a past surgical history that includes Ventriculoperitoneal shunt; [place] tooth extraction; and Prostate biopsy (04/2025).     Social History  He reports that he quit smoking about 18 years ago. His smoking use included cigars. He has never used smokeless tobacco. He reports current alcohol use of about 1.0 standard drink of alcohol per week. He reports that he does not use  drugs.    Family History  Family History[1]     Allergies  Patient has no known allergies.     Physical Exam  Constitutional: Awake/alert/oriented x3, no distress, cooperative.  Skin: Warm and dry.  Eyes: EOMI, clear sclera.  ENMT: Mucus membranes moist, no apparent injury.  Head/Neck: Neck supple, no apparent injury.  Respiratory: Unlabored breathing.  Cardiovascular: Regular rate.  GI: Softly distended, appropriately tender to palpation, lap sites C/D/I with Dermabond. Midline lap incision with some serosanguinous drainage from the superior aspect of the incision.   : Castaneda catheter in place with reba colored urine in leg bag.   Musculoskeletal: ROM intact, normal strength.  Extremities: GILLIS.  Neurological: Alert and oriented x3, no focal deficits.  Psychological: Appropriate mood and behavior.      Last Recorded Vitals  Blood pressure (!) 164/113, pulse 94, temperature 36.3 °C (97.3 °F), temperature source Temporal, resp. rate 18, SpO2 95%.    Relevant Results  Results for orders placed or performed during the hospital encounter of 06/24/25 (from the past 24 hours)   CBC and Auto Differential   Result Value Ref Range    WBC 7.6 4.4 - 11.3 x10*3/uL    nRBC 0.0 0.0 - 0.0 /100 WBCs    RBC 4.22 (L) 4.50 - 5.90 x10*6/uL    Hemoglobin 13.2 (L) 13.5 - 17.5 g/dL    Hematocrit 41.6 41.0 - 52.0 %    MCV 99 80 - 100 fL    MCH 31.3 26.0 - 34.0 pg    MCHC 31.7 (L) 32.0 - 36.0 g/dL    RDW 13.0 11.5 - 14.5 %    Platelets 219 150 - 450 x10*3/uL    Neutrophils % 63.4 40.0 - 80.0 %    Immature Granulocytes %, Automated 0.3 0.0 - 0.9 %    Lymphocytes % 20.1 13.0 - 44.0 %    Monocytes % 14.1 2.0 - 10.0 %    Eosinophils % 0.9 0.0 - 6.0 %    Basophils % 1.2 0.0 - 2.0 %    Neutrophils Absolute 4.80 1.20 - 7.70 x10*3/uL    Immature Granulocytes Absolute, Automated 0.02 0.00 - 0.70 x10*3/uL    Lymphocytes Absolute 1.52 1.20 - 4.80 x10*3/uL    Monocytes Absolute 1.07 (H) 0.10 - 1.00 x10*3/uL    Eosinophils Absolute 0.07 0.00 - 0.70  x10*3/uL    Basophils Absolute 0.09 0.00 - 0.10 x10*3/uL   Comprehensive metabolic panel   Result Value Ref Range    Glucose 111 (H) 74 - 99 mg/dL    Sodium 137 136 - 145 mmol/L    Potassium 4.1 3.5 - 5.3 mmol/L    Chloride 103 98 - 107 mmol/L    Bicarbonate 27 21 - 32 mmol/L    Anion Gap 11 10 - 20 mmol/L    Urea Nitrogen 15 6 - 23 mg/dL    Creatinine 1.24 0.50 - 1.30 mg/dL    eGFR 70 >60 mL/min/1.73m*2    Calcium 8.6 8.6 - 10.3 mg/dL    Albumin 4.2 3.4 - 5.0 g/dL    Alkaline Phosphatase 74 33 - 120 U/L    Total Protein 6.7 6.4 - 8.2 g/dL    AST 14 9 - 39 U/L    Bilirubin, Total 0.7 0.0 - 1.2 mg/dL    ALT 17 10 - 52 U/L   Lactate   Result Value Ref Range    Lactate 0.8 0.4 - 2.0 mmol/L   Coagulation Screen   Result Value Ref Range    Protime 11.4 9.8 - 12.4 seconds    INR 1.0 0.9 - 1.1    aPTT 25 (L) 26 - 36 seconds      CT abdomen pelvis w IV contrast  Result Date: 6/24/2025  Interpreted By:  Maximiliano Connelly, STUDY: CT ABDOMEN PELVIS W IV CONTRAST;  6/24/2025 7:26 pm   INDICATION: Signs/Symptoms:Abdominal pain. Status post robotic assisted laparoscopic prostatectomy with pelvic lymph node dissection.   COMPARISON: None.   ACCESSION NUMBER(S): [id number]   ORDERING CLINICIAN: AUBRIE PELLETIER   TECHNIQUE: Axial CT images of the abdomen and pelvis with coronal and sagittal reconstructed images obtained after intravenous administration of 75 mL of Omnipaque 350   FINDINGS: LOWER CHEST: Bibasilar atelectasis and or scarring.   ABDOMEN:   LIVER: Within normal limits. BILE DUCTS: Normal caliber. GALLBLADDER: No calcified gallstones. No wall thickening. PANCREAS: Within normal limits. SPLEEN: Within normal limits. ADRENALS: Within normal limits. KIDNEYS and URETERS: Symmetric enhancement without evidence for calculi. Mild right-sided pelvicaliectasis. No perinephric inflammatory changes seen.   VESSELS: No aortic aneurysm. RETROPERITONEUM: There is stranding and ill-defined fluid likely sequela of recent surgery and  pelvic node dissection.   PELVIS:   REPRODUCTIVE ORGANS: Status post prostatectomy. There is inflammatory stranding in the operative bed with ill-defined fluid extending to the retroperitoneal soft tissues just inferior to level of the lower pole of the kidneys. These findings likely relate to postsurgical seroma/evolving hematoma. Sterility of the fluid can not be assessed by imaging. BLADDER: A Castaneda catheter is noted in the bladder which is decompressed with apparent wall thickening. There is perivesical stranding and ill-defined fluid.   BOWEL: No dilated bowel.  Normal appendix. PERITONEUM: There is mild presacral edema and stranding. Mild pneumoperitoneum likely relate to recent surgery.   ABDOMINAL WALL: There is subcutaneous soft tissue stranding with multiple foci of free air which is favored to relate to sequela of recent surgery. Small ventral hernia contains fat. No discrete fluid collection is identified. BONES: Multilevel degenerative changes of the spine. Diffuse sclerosis with cortical and trabecular thickening of the L1 and L5 vertebra extending to the posterior elements suspicious for Paget's disease.       Status post prostatectomy. There is inflammatory stranding and punctate foci of air in the operative bed. Ill-defined fluid and stranding is noted in the pelvis extending along the pelvic sidewall and retroperitoneal soft tissues just inferior to level of the lower pole of the kidneys. These findings likely relate to postsurgical seroma/evolving hematoma. Sterility of the fluid can not be assessed by imaging.   A Castaneda catheter is noted in the bladder which is decompressed with apparent wall thickening. There is perivesical stranding and ill-defined fluid which could be sequela of recent surgery. Correlate with urinalysis if there is clinical concern for superimposed cystitis..   There is mild presacral edema and stranding. Mild pneumoperitoneum likely relate to recent surgery.   There is  subcutaneous soft tissue stranding with multiple foci of free air which is favored to relate to sequela of recent surgery. Correlate clinically if there is concern for superimposed infection. Small ventral hernia contains fat. No discrete fluid collection is identified.   Diffuse sclerosis with cortical and trabecular thickening of the L1 and L5 vertebra extending to the posterior elements suspicious for Paget's disease.   Additional findings as described above.   MACRO: None   Signed by: Maximiliano Connelly 6/24/2025 7:46 PM Dictation workstation:   [id number]        Assessment/Plan   - Abdomen softly distended, appropriately tender to palpation, lap sites C/D/I with Dermabond. Midline lap incision with some serosanguinous drainage from the superior aspect of the incision.   Mooney catheter in place with reba colored urine in leg bag.   - Maintain mooney catheter - patient has a TOV on Thursday  - No concern for infection at this time  - Dermabond placed over open, seeping area of midline lap incision and covered with dry, clean gauze. No additional seeping from wound. No other wounds seeping.    Ok to be discharged from a urological perspective. Follow up for scheduled TOV on Thursday. Discussed with Dr. Quinteros.     I spent 45 minutes in the professional and overall care of this patient.      Rachel Pate, APRN-CNP         [1]   Family History  Problem Relation Name Age of Onset    Hypertension Mother      Heart attack Mother      Stroke Mother      Hypertension Father      Heart attack Father      Hypertension Brother      Hypertension Brother      Stroke Brother      No Known Problems Maternal Grandmother      Hypertension Maternal Grandfather      Hypertension Paternal Grandmother      Hypertension Paternal Grandfather      No Known Problems Daughter      No Known Problems Son      No Known Problems Mother's Sister      No Known Problems Mother's Brother      No Known Problems Father's Sister      No Known Problems  Father's Brother

## 2025-06-25 NOTE — ED PROVIDER NOTES
Emergency Department Provider Note             History of Present Illness   CC: Post-op Problem    History provided by: Patient and Family Member  Limitations to History: None  External Records Reviewed: prior ED provider notes, clinic notes, discharge summaries    HPI:  Adryan Perez is a 50 y.o. male with history including prostate cancer s/p robotic prostatectomy, HAYLEY, bilateral lymph node dissection on 6/23/25 with mooney placement presenting to the emergency department for drainage from his incision site. States this started tonight and has saturated through multiple clothes and dressing. He denies any obvious inciting event. States his mooney has been draining appropriately and he denies any pain. Denies fever, chills, vomiting, hematuria, lightheadedness, shortness of breath, abdominal pain.     Physical Exam   Triage vitals:  T 36.3 °C (97.3 °F)  HR 94  BP (!) 164/113  RR 18  O2 95 % None (Room air)    General: awake, well-appearing, no distress  Head: normocephalic, atraumatic  Eyes: pupils equal, extraocular movements grossly intact, no conjunctival injection or scleral icterus  ENT: nares patent, moist mucous membranes  Neck: supple, trachea midline, no masses  CV: regular rate and rhythm, well-perfused  Resp: breathing is non-labored, speaking in full sentences. Lungs are clear to auscultation bilaterally  GI: soft, non-distended, appropriately tender to palpation in the post-op setting, midline lap incision with serosanguinous drainage - residual drainage on clothing and dressing overlying the site is saturated, no rebound or guarding, other incision sites are c/d/I without signs of surrounding erythema or fluctuance  : mooney catheter is in place draining reba-colored urine   Extremities: no edema, no gross deformity   Neuro: alert, oriented, speech is fluent, face is symmetric, moving all extremities   Psych: Appropriate mood and affect    ED Course & Medical Decision Making     50 y.o. male with  history including prostate cancer s/p robotic prostatectomy, HAYLEY, bilateral lymph node dissection on 6/23/25 with mooney placement presenting to the emergency department for serosanguinous drainage from his incision site.  He is well-appearing, no distress.  Abdominal exam is notable for the drainage on his dressing and residual drainage on his clothing.  No significant wound dehiscence.  No obvious signs of infection.  Given his symptoms in the setting of recent procedure, labs and CT were obtained. Labs are unremarkable.  CT abdomen/pelvis shows postoperative changes with foci of air in the operative bed, possible evolving seroma/hematoma. Surgical SUE was consulted for urology. No acute intervention at this time. Dermabond was applied over the area of drainage and clean dressing placed over the area. Urology felt that discharge with outpatient follow up, scheduled for Thursday, is appropriate. He's comfortable with this plan. Given strict return precautions. Advised outpatient follow up.      Social Determinants Limiting Care: None identified    Results: Independently reviewed and interpreted by me. Please see ED course and MDM for my full interpretation.     Chronic Medical Conditions Significantly Affecting Care: as per MDM    Patient was discussed with the following consultants/services:  surgical SUE    Care Considerations: as per [facility]    Diagnoses as of 06/24/25 1937   Postoperative complication of skin involving drainage from surgical wound       Disposition   Discharge    MD Yuki Moreno MD  06/29/25 [id number]

## 2025-06-25 NOTE — PROGRESS NOTES
[facility]  Outpatient Clinic Note    Patient Name:  Adryan Perez  MRN:  [id number]  :  1975  Date of Service: 2025     Visit type: Follow up visit    HPI    Interval History:  Adryan Perez is a 50 y.o. male with past medical history of HTN, mixed HLD, prediabetes and elevated PSA, who is being seen today for  problems listed below.     Problem list/Chief complaints:  Elevated PSA - s/p RALP, bilateral pelvic lymph node dissectionwith Dr. Quinteros on 25: Visit with Dr. Quinteros. Adryan Perez is a 49 y.o. male with past medical history of HTN, mixed HLD, prediabetes and elevated PSA. He presents for his initial consultation today via telehealth visit. Has previously seen JAYSHREE Gurrola. Recent PSA on 1/3/25 11.08 and 10.28 on 10/12/24. He is scheduled for TURP on 25.     Today, he reports feeling overall well. Reports stable urinary symptoms. He denies any fevers, chills, nausea, vomiting.   We discussed his presentation in detail.   Reviewed and interpreted patient's biopsy result which revealed prostate cancer.  Discussed with patient the different treatment options of prostate cancer, including radiation therapy and prostatectomy.  Will proceed with surgery as schedule. Patient agrees to the plan.    25: S/p RALP, bilateral pelvic lymph node dissection with Dr. Quinteros.    25: Patient was seen in ER for post op drainage from wound. Inpatient Urology consulted, dermabond placed over midline lap incision and covered with gauze. CT AP with no concerns, patient discharged home.    25: Patient presents for complaints of persistent drainage from midline lap incision site that is saturating dressing and getting his clothes wet. Patient denies fever or chills. Wound assessed and cleaned, bacitracin and new dressing applied.    Medical History[1]    Surgical History[2]    Social History     Socioeconomic History    Marital status: Single     Spouse name:  Not on file    Number of children: Not on file    Years of education: Not on file    Highest education level: Not on file   Occupational History    Not on file   Tobacco Use    Smoking status: Former     Types: Cigars     Quit date: 2006     Years since quittin.5    Smokeless tobacco: Never   Vaping Use    Vaping status: Never Used   Substance and Sexual Activity    Alcohol use: Yes     Alcohol/week: 1.0 standard drink of alcohol     Types: 1 Cans of beer per week    Drug use: Never    Sexual activity: Not on file   Other Topics Concern    Not on file   Social History Narrative    Not on file     Social Drivers of Health     Financial Resource Strain: Low Risk  (2025)    Overall Financial Resource Strain (CARDIA)     Difficulty of Paying Living Expenses: Not hard at all   Food Insecurity: No Food Insecurity (2025)    Hunger Vital Sign     Worried About Running Out of Food in the Last Year: Never true     Ran Out of Food in the Last Year: Never true   Transportation Needs: No Transportation Needs (2025)    PRAPARE - Transportation     Lack of Transportation (Medical): No     Lack of Transportation (Non-Medical): No   Physical Activity: Not on file   Stress: Not on file   Social Connections: Not on file   Intimate Partner Violence: Not At Risk (2025)    Humiliation, Afraid, Rape, and Kick questionnaire     Fear of Current or Ex-Partner: No     Emotionally Abused: No     Physically Abused: No     Sexually Abused: No   Housing Stability: Low Risk  (2025)    Housing Stability Vital Sign     Unable to Pay for Housing in the Last Year: No     Number of Times Moved in the Last Year: 0     Homeless in the Last Year: No       Allergies[3]     Current Medications[4]     Review of system:  All other systems have been reviewed and are negative for complaints      Last recorded vitals:  There were no vitals taken for this visit.    Physical Exam:  General: Appears comfortable and in no apparent  distress.  Head: Normocephalic, atraumatic  Eyes: Non-injected conjunctiva, sclera clear, no proptosis  Lungs: Breathing is easy, non-labored. Speaking in clear and complete sentences. Normal diaphragmatic movement.  Cardiovascular: no peripheral edema, cyanosis or pallor.   Abdomen: soft, non-distended, non-tender; lap sites well approximated, midline lap site with serosanguineous drainage noted, no foul smell, no s/sx of infection, mild bruising noted on abdomen.  : Bladder: non tender, not distended; mooney catheter with clear, yellow urine draining into catheter bag.   MSK: Ambulatory with steady gait, unassisted  Skin: No visible rashes or lesions  Neurologic: Alert, oriented to person, place, and time  Psychiatric: mood and affect appropriate      Imaging  CT abdomen pelvis w IV contrast  Narrative: Interpreted By:  Maximiliano Connelly,   STUDY:  CT ABDOMEN PELVIS W IV CONTRAST;  6/24/2025 7:26 pm      INDICATION:  Signs/Symptoms:Abdominal pain. Status post robotic assisted  laparoscopic prostatectomy with pelvic lymph node dissection.      COMPARISON:  None.      ACCESSION NUMBER(S):  [id number]      ORDERING CLINICIAN:  AUBRIE EPLLETIER      TECHNIQUE:  Axial CT images of the abdomen and pelvis with coronal and sagittal  reconstructed images obtained after intravenous administration of 75  mL of Omnipaque 350      FINDINGS:  LOWER CHEST: Bibasilar atelectasis and or scarring.      ABDOMEN:      LIVER: Within normal limits.  BILE DUCTS: Normal caliber.  GALLBLADDER: No calcified gallstones. No wall thickening.  PANCREAS: Within normal limits.  SPLEEN: Within normal limits.  ADRENALS: Within normal limits.  KIDNEYS and URETERS: Symmetric enhancement without evidence for  calculi. Mild right-sided pelvicaliectasis. No perinephric  inflammatory changes seen.      VESSELS: No aortic aneurysm.  RETROPERITONEUM: There is stranding and ill-defined fluid likely  sequela of recent surgery and pelvic node dissection.       PELVIS:      REPRODUCTIVE ORGANS: Status post prostatectomy. There is inflammatory  stranding in the operative bed with ill-defined fluid extending to  the retroperitoneal soft tissues just inferior to level of the lower  pole of the kidneys. These findings likely relate to postsurgical  seroma/evolving hematoma. Sterility of the fluid can not be assessed  by imaging. BLADDER: A Castaneda catheter is noted in the bladder which  is decompressed with apparent wall thickening. There is perivesical  stranding and ill-defined fluid.      BOWEL: No dilated bowel.  Normal appendix.  PERITONEUM: There is mild presacral edema and stranding. Mild  pneumoperitoneum likely relate to recent surgery.      ABDOMINAL WALL: There is subcutaneous soft tissue stranding with  multiple foci of free air which is favored to relate to sequela of  recent surgery. Small ventral hernia contains fat. No discrete fluid  collection is identified. BONES: Multilevel degenerative changes of  the spine. Diffuse sclerosis with cortical and trabecular thickening  of the L1 and L5 vertebra extending to the posterior elements  suspicious for Paget's disease.      Impression: Status post prostatectomy. There is inflammatory stranding and  punctate foci of air in the operative bed. Ill-defined fluid and  stranding is noted in the pelvis extending along the pelvic sidewall  and retroperitoneal soft tissues just inferior to level of the lower  pole of the kidneys. These findings likely relate to postsurgical  seroma/evolving hematoma. Sterility of the fluid can not be assessed  by imaging.      A Castaneda catheter is noted in the bladder which is decompressed with  apparent wall thickening. There is perivesical stranding and  ill-defined fluid which could be sequela of recent surgery. Correlate  with urinalysis if there is clinical concern for superimposed  cystitis..      There is mild presacral edema and stranding. Mild pneumoperitoneum  likely relate to  recent surgery.      There is subcutaneous soft tissue stranding with multiple foci of  free air which is favored to relate to sequela of recent surgery.  Correlate clinically if there is concern for superimposed infection.  Small ventral hernia contains fat. No discrete fluid collection is  identified.      Diffuse sclerosis with cortical and trabecular thickening of the L1  and L5 vertebra extending to the posterior elements suspicious for  Paget's disease.      Additional findings as described above.      MACRO:  None      Signed by: Maximiliano Connelly 6/24/2025 7:46 PM  Dictation workstation:   [id number]      Labs  Admission on 06/24/2025, Discharged on 06/24/2025   Component Date Value    WBC 06/24/2025 7.6     nRBC 06/24/2025 0.0     RBC 06/24/2025 4.22 (L)     Hemoglobin 06/24/2025 13.2 (L)     Hematocrit 06/24/2025 41.6     MCV 06/24/2025 99     MCH 06/24/2025 31.3     MCHC 06/24/2025 31.7 (L)     RDW 06/24/2025 13.0     Platelets 06/24/2025 219     Neutrophils % 06/24/2025 63.4     Immature Granulocytes %,* 06/24/2025 0.3     Lymphocytes % 06/24/2025 20.1     Monocytes % 06/24/2025 14.1     Eosinophils % 06/24/2025 0.9     Basophils % 06/24/2025 1.2     Neutrophils Absolute 06/24/2025 4.80     Immature Granulocytes Ab* 06/24/2025 0.02     Lymphocytes Absolute 06/24/2025 1.52     Monocytes Absolute 06/24/2025 1.07 (H)     Eosinophils Absolute 06/24/2025 0.07     Basophils Absolute 06/24/2025 0.09     Glucose 06/24/2025 111 (H)     Sodium 06/24/2025 137     Potassium 06/24/2025 4.1     Chloride 06/24/2025 103     Bicarbonate 06/24/2025 27     Anion Gap 06/24/2025 11     Urea Nitrogen 06/24/2025 15     Creatinine 06/24/2025 1.24     eGFR 06/24/2025 70     Calcium 06/24/2025 8.6     Albumin 06/24/2025 4.2     Alkaline Phosphatase 06/24/2025 74     Total Protein 06/24/2025 6.7     AST 06/24/2025 14     Bilirubin, Total 06/24/2025 0.7     ALT 06/24/2025 17     Lactate 06/24/2025 0.8     Protime 06/24/2025 11.4      INR 06/24/2025 1.0     aPTT 06/24/2025 25 (L)        Assessment and Plan:  Adryan Perez is a 50 y.o. male with history of elevated PSA - s/p RALP, bilateral pelvic lymph node dissectionwith Dr. Quinteros on 6/23/25, who presents for complaints of drainage from incision wound post op.     Plan:  -Patient instructed to keep incisions clean and dry.   -He will apply bacitracin to wound and keep dressing on as long as he continues to have drainage. -He will keep wound open to air if no drainage is noted.  -Patient to drink plenty of fluids for hydration and urine output  -Catheter care information provided  -patient to call our office or go to ER if symptoms worsens or if he develops fever or chills  -Follow-up in 1 week for TOV, or sooner if needed, to reassess symptoms..    All questions and concerns were addressed. Patient verbalizes understanding and has no other questions at this time.     Some elements copied from Dr. Quinteros's note on 5/5/25, the elements have been updated and all reflect current decision making from today, 06/25/25    E&M visit today is associated with current or anticipated ongoing medical care services related to a patient's single, serious condition or a complex condition.    ADE Gurrola-CNP   [facility]  06/25/25 5:11 PM       [1]   Past Medical History:  Diagnosis Date    BPPV (benign paroxysmal positional vertigo)     Congenital hydrocephalus, unspecified     Elevated PSA     Essential (primary) hypertension     HLD (hyperlipidemia)     10/12/24 Chol 209, , Triglycerides 105    Old myocardial infarction     History of heart attack- 2005, no interventions, no issues since then, no longer follows with cardiology    Prediabetes    [2]   Past Surgical History:  Procedure Laterality Date    PROSTATE BIOPSY  04/2025    VENTRICULOPERITONEAL SHUNT      Since infancy, multiple revisions, last age 15    WISDOM TOOTH EXTRACTION     [3] No Known Allergies  [4]   Current Outpatient  Medications:     acetaminophen (Tylenol) 325 mg tablet, Take 2 tablets (650 mg) by mouth every 6 hours if needed for mild pain (1 - 3)., Disp: 30 tablet, Rfl: 0    amLODIPine (Norvasc) 10 mg tablet, Take 1 tablet (10 mg) by mouth once daily., Disp: 90 tablet, Rfl: 1    atenoloL-chlorthalidone (Tenoretic) 50-25 mg tablet, Take 1 tablet by mouth once daily., Disp: 90 tablet, Rfl: 0    ibuprofen 600 mg tablet, Take 1 tablet (600 mg) by mouth every 6 hours if needed for mild pain (1 - 3) for up to 30 doses., Disp: 30 tablet, Rfl: 0    multivitamin with minerals tablet, Take 1 tablet by mouth once daily., Disp: , Rfl:   No current facility-administered medications for this visit.

## 2025-06-25 NOTE — DISCHARGE INSTRUCTIONS
You were seen in the emergency department for drainage from your surgical incision site. Your labs are reassuring and your CT did not show any significant complications or obvious signs of infection. Skin glue was applied to better close your incision. We feel that it is safe for you to go home. Continue to monitor symptoms closely. Follow up with urology this week as scheduled.  Return to the emergency department with worsening or new concerning symptoms.

## 2025-06-29 NOTE — PROGRESS NOTES
[facility] [place]  Outpatient Clinic Note    Patient Name:  Adryan Perez  MRN:  [id number]  :  1975  Date of Service: 2025     Visit type: Follow up visit    HPI    Interval History:  Adryan Perez is a 50 y.o. male with past medical history of HTN, mixed HLD, prediabetes and elevated PSA, who is being seen today for  problems listed below.     Problem list/Chief complaints:  Prostate cancer - s/p RALP, bilateral pelvic lymph node dissection with Dr. Quinteros on 25  Elevated PSA       25: Visit with Dr. Quinteros. Adryan Perez is a 49 y.o. male with past medical history of HTN, mixed HLD, prediabetes and elevated PSA. He presents for his initial consultation today via telehealth visit. Has previously seen JAYSHREE Gurrola. Recent PSA on 1/3/25 11.08 and 10.28 on 10/12/24. He is scheduled for TURP on 25.     Today, he reports feeling overall well. Reports stable urinary symptoms. He denies any fevers, chills, nausea, vomiting.   We discussed his presentation in detail.   Reviewed and interpreted patient's biopsy result which revealed prostate cancer.  Discussed with patient the different treatment options of prostate cancer, including radiation therapy and prostatectomy.  Will proceed with surgery as schedule. Patient agrees to the plan.    25: S/p RALP, bilateral pelvic lymph node dissection with Dr. Quinteros.    25: Patient was seen in ER for post op drainage from wound. Inpatient Urology consulted, dermabond placed over midline lap incision and covered with gauze. CT AP with no concerns, patient discharged home.    25: Patient presents for complaints of persistent drainage from midline lap incision site that is saturating dressing and getting his clothes wet. Patient denies fever or chills. Wound assessed and cleaned, bacitracin and new dressing applied.    25: Patient presents for follow up and TOV. He has been doing well since surgery. Urine is clear and  yellow draining into catheter bag. Pain has been managed with oral pain medications. He is eating and drinking with no issues, he has normal bowel movements. He denies any fevers or chills, no more drainage from incision sites. He is ambulating at baseline. TOV passed, PVR 78 ml.    Medical History[1]    Surgical History[2]    Social History     Socioeconomic History    Marital status: Single     Spouse name: Not on file    Number of children: Not on file    Years of education: Not on file    Highest education level: Not on file   Occupational History    Not on file   Tobacco Use    Smoking status: Former     Types: Cigars     Quit date: 2006     Years since quittin.5    Smokeless tobacco: Never   Vaping Use    Vaping status: Never Used   Substance and Sexual Activity    Alcohol use: Yes     Alcohol/week: 1.0 standard drink of alcohol     Types: 1 Cans of beer per week    Drug use: Never    Sexual activity: Not on file   Other Topics Concern    Not on file   Social History Narrative    Not on file     Social Drivers of Health     Financial Resource Strain: Low Risk  (2025)    Overall Financial Resource Strain (CARDIA)     Difficulty of Paying Living Expenses: Not hard at all   Food Insecurity: No Food Insecurity (2025)    Hunger Vital Sign     Worried About Running Out of Food in the Last Year: Never true     Ran Out of Food in the Last Year: Never true   Transportation Needs: No Transportation Needs (2025)    PRAPARE - Transportation     Lack of Transportation (Medical): No     Lack of Transportation (Non-Medical): No   Physical Activity: Not on file   Stress: Not on file   Social Connections: Not on file   Intimate Partner Violence: Not At Risk (2025)    Humiliation, Afraid, Rape, and Kick questionnaire     Fear of Current or Ex-Partner: No     Emotionally Abused: No     Physically Abused: No     Sexually Abused: No   Housing Stability: Low Risk  (2025)    Housing Stability Vital  Sign     Unable to Pay for Housing in the Last Year: No     Number of Times Moved in the Last Year: 0     Homeless in the Last Year: No       Allergies[3]     Current Medications[4]     Review of system:  All other systems have been reviewed and are negative for complaints      Last recorded vitals:  There were no vitals taken for this visit.    Physical Exam:  General: Appears comfortable and in no apparent distress.  Head: Normocephalic, atraumatic  Eyes: Non-injected conjunctiva, sclera clear, no proptosis  Lungs: Breathing is easy, non-labored. Speaking in clear and complete sentences. Normal diaphragmatic movement.  Cardiovascular: no peripheral edema, cyanosis or pallor.   Abdomen: soft, non-distended, non-tender; lap sites well approximated, no s/sx of infection  : Bladder: non tender, not distended  MSK: Ambulatory with steady gait, unassisted  Skin: No visible rashes or lesions  Neurologic: Alert, oriented to person, place, and time  Psychiatric: mood and affect appropriate      Imaging  CT abdomen pelvis w IV contrast  Narrative: Interpreted By:  Maximiliano Connelly,   STUDY:  CT ABDOMEN PELVIS W IV CONTRAST;  6/24/2025 7:26 pm      INDICATION:  Signs/Symptoms:Abdominal pain. Status post robotic assisted  laparoscopic prostatectomy with pelvic lymph node dissection.      COMPARISON:  None.      ACCESSION NUMBER(S):  [id number]      ORDERING CLINICIAN:  AUBRIE PELLETIER      TECHNIQUE:  Axial CT images of the abdomen and pelvis with coronal and sagittal  reconstructed images obtained after intravenous administration of 75  mL of Omnipaque 350      FINDINGS:  LOWER CHEST: Bibasilar atelectasis and or scarring.      ABDOMEN:      LIVER: Within normal limits.  BILE DUCTS: Normal caliber.  GALLBLADDER: No calcified gallstones. No wall thickening.  PANCREAS: Within normal limits.  SPLEEN: Within normal limits.  ADRENALS: Within normal limits.  KIDNEYS and URETERS: Symmetric enhancement without evidence  for  calculi. Mild right-sided pelvicaliectasis. No perinephric  inflammatory changes seen.      VESSELS: No aortic aneurysm.  RETROPERITONEUM: There is stranding and ill-defined fluid likely  sequela of recent surgery and pelvic node dissection.      PELVIS:      REPRODUCTIVE ORGANS: Status post prostatectomy. There is inflammatory  stranding in the operative bed with ill-defined fluid extending to  the retroperitoneal soft tissues just inferior to level of the lower  pole of the kidneys. These findings likely relate to postsurgical  seroma/evolving hematoma. Sterility of the fluid can not be assessed  by imaging. BLADDER: A Castaneda catheter is noted in the bladder which  is decompressed with apparent wall thickening. There is perivesical  stranding and ill-defined fluid.      BOWEL: No dilated bowel.  Normal appendix.  PERITONEUM: There is mild presacral edema and stranding. Mild  pneumoperitoneum likely relate to recent surgery.      ABDOMINAL WALL: There is subcutaneous soft tissue stranding with  multiple foci of free air which is favored to relate to sequela of  recent surgery. Small ventral hernia contains fat. No discrete fluid  collection is identified. BONES: Multilevel degenerative changes of  the spine. Diffuse sclerosis with cortical and trabecular thickening  of the L1 and L5 vertebra extending to the posterior elements  suspicious for Paget's disease.      Impression: Status post prostatectomy. There is inflammatory stranding and  punctate foci of air in the operative bed. Ill-defined fluid and  stranding is noted in the pelvis extending along the pelvic sidewall  and retroperitoneal soft tissues just inferior to level of the lower  pole of the kidneys. These findings likely relate to postsurgical  seroma/evolving hematoma. Sterility of the fluid can not be assessed  by imaging.      A Castaneda catheter is noted in the bladder which is decompressed with  apparent wall thickening. There is perivesical  stranding and  ill-defined fluid which could be sequela of recent surgery. Correlate  with urinalysis if there is clinical concern for superimposed  cystitis..      There is mild presacral edema and stranding. Mild pneumoperitoneum  likely relate to recent surgery.      There is subcutaneous soft tissue stranding with multiple foci of  free air which is favored to relate to sequela of recent surgery.  Correlate clinically if there is concern for superimposed infection.  Small ventral hernia contains fat. No discrete fluid collection is  identified.      Diffuse sclerosis with cortical and trabecular thickening of the L1  and L5 vertebra extending to the posterior elements suspicious for  Paget's disease.      Additional findings as described above.      MACRO:  None      Signed by: Maximiliano Connelly 6/24/2025 7:46 PM  Dictation workstation:   [id number]      Labs  Lab Results   Component Value Date    PSA 11.08 (H) 01/03/2025    PSA 4.35 (H) 02/19/2021    PSA 3.98 02/17/2020     Lab Results   Component Value Date    GLUCOSE 111 (H) 06/24/2025    CALCIUM 8.6 06/24/2025     06/24/2025    K 4.1 06/24/2025    CO2 27 06/24/2025     06/24/2025    BUN 15 06/24/2025    CREATININE 1.24 06/24/2025     Lab Results   Component Value Date    PSA 11.08 (H) 01/03/2025    PSA 4.35 (H) 02/19/2021    PSA 3.98 02/17/2020         Assessment and Plan:  Adryan Perez is a 50 y.o. male with history of elevated PSA, prostate cancer - s/p RALP, bilateral pelvic lymph node dissectionwith Dr. Quinteros on 6/23/25, who presents for POV and TOV.    On exam, abdomen is soft, nondistended and nontender. Lap sites are clean, dry and well approximated with dermabond. Urine is clear and yellow. We proceeded with TOV and he was able to spontaneously void  and empty bladder.    We discussed urinary incontinence after RALP in great detail. He will use briefs and exchange pads/liners throughout the day. We discussed Kegel exercises to help  strengthen pelvic floor and regain urinary control. We discussed use of Tadalafil 5mg daily to promote blood flow and healing within the pelvis. He denies use of nitroglycerin or other nitro-containing products.     Recommend doing pelvic floor exercises (Kegel exercises):  1. When first waking in the morning and before voiding contract and maintain contraction of the pelvic floor muscles.  2. Immediately after standing contract the pelvic floor muscles 10 times before walking  3. Hold the pelvic floor muscles in the contracted state until reaching the bathroom  4. During voiding, interrupt the urinary stream four to five times by aaliyah the pelvic floor muscles  5. Throughout the day, contract the pelvic floor muscles five times, holding each contraction for 5 to 10 seconds (repeat this exercise every hour)         Plan:  - TOV today passed  - PVR 78 ml  - Patient encouraged to drink plenty of fluids to maintain hydration and clear urine output  - Discussed that they may have some irritative voiding symptoms over the next few days: burning, frequency, urgency  - Activity restrictions reviewed, discussed post op expectations  - Patient instructed to go to ED if unable to void in 4-6 hr or unable to void with urge  -Patient to use bacitracin ointment on abdominal lap sites to help with healing  -Prescription for Tadalafil 5 mg daily sent to pharmacy. Patient to use [facility]  -PSA ordered to establish new baseline  -He will follow in clinic with Dr. Quinteros in 6 weeks for post-operative visit and to review pathology. He will have PSA blood test drawn prior to that visit.      All questions and concerns were addressed. Patient verbalizes understanding and has no other questions at this time.     Some elements copied from my note on 6/25/25, the elements have been updated and all reflect current decision making from today, 06/30/25    E&M visit today is associated with current or anticipated ongoing medical care  services related to a patient's single, serious condition or a complex condition.    Jessika Levi, ADE-CNP   [facility]  06/30/25 11:33 AM         [1]   Past Medical History:  Diagnosis Date    BPPV (benign paroxysmal positional vertigo)     Congenital hydrocephalus, unspecified     Elevated PSA     Essential (primary) hypertension     HLD (hyperlipidemia)     10/12/24 Chol 209, , Triglycerides 105    Old myocardial infarction     History of heart attack- 2005, no interventions, no issues since then, no longer follows with cardiology    Prediabetes    [2]   Past Surgical History:  Procedure Laterality Date    PROSTATE BIOPSY  04/2025    VENTRICULOPERITONEAL SHUNT      Since infancy, multiple revisions, last age 15    WISDOM TOOTH EXTRACTION     [3] No Known Allergies  [4]   Current Outpatient Medications:     acetaminophen (Tylenol) 325 mg tablet, Take 2 tablets (650 mg) by mouth every 6 hours if needed for mild pain (1 - 3)., Disp: 30 tablet, Rfl: 0    amLODIPine (Norvasc) 10 mg tablet, Take 1 tablet (10 mg) by mouth once daily., Disp: 90 tablet, Rfl: 1    atenoloL-chlorthalidone (Tenoretic) 50-25 mg tablet, Take 1 tablet by mouth once daily., Disp: 90 tablet, Rfl: 0    ibuprofen 600 mg tablet, Take 1 tablet (600 mg) by mouth every 6 hours if needed for mild pain (1 - 3) for up to 30 doses., Disp: 30 tablet, Rfl: 0    multivitamin with minerals tablet, Take 1 tablet by mouth once daily., Disp: , Rfl:     tadalafil (Cialis) 5 mg tablet, Take 1 tablet (5 mg) by mouth once daily. Take 1 hour before activity as needed., Disp: 30 tablet, Rfl: 3

## 2025-06-30 ENCOUNTER — OFFICE VISIT (OUTPATIENT)
Dept: UROLOGY | Facility: HOSPITAL | Age: 50
End: 2025-06-30
Payer: COMMERCIAL

## 2025-06-30 DIAGNOSIS — Z48.89 POSTOPERATIVE VISIT: ICD-10-CM

## 2025-06-30 DIAGNOSIS — C61 MALIGNANT NEOPLASM OF PROSTATE (MULTI): Primary | ICD-10-CM

## 2025-06-30 PROCEDURE — 1036F TOBACCO NON-USER: CPT | Performed by: NURSE PRACTITIONER

## 2025-06-30 PROCEDURE — 99214 OFFICE O/P EST MOD 30 MIN: CPT | Performed by: NURSE PRACTITIONER

## 2025-06-30 PROCEDURE — 51798 US URINE CAPACITY MEASURE: CPT | Performed by: NURSE PRACTITIONER

## 2025-06-30 RX ORDER — TADALAFIL 5 MG/1
5 TABLET ORAL DAILY
Qty: 30 TABLET | Refills: 3 | Status: SHIPPED | OUTPATIENT
Start: 2025-06-30

## 2025-06-30 NOTE — PROGRESS NOTES
Pt is here today for a trial of void, name and date of birth was verified. Procedure was explained to pt, and understood. Pt tolerated 240cc of sterile saline infused through urinary catheter without difficulty. Urinary catheter was removed and patient voided 300 cc. A PVR scan was done and showed 78mL of urine left in the bladder.   It was explained to pt that if unable to urinate to go to the emergency room. Pt was told and understood to drink plenty of fluids to keep the bladder stimulated. If there were any questions or concerns pt understood --- he/she is to call the office.

## 2025-07-07 ENCOUNTER — OFFICE VISIT (OUTPATIENT)
Dept: UROLOGY | Facility: HOSPITAL | Age: 50
End: 2025-07-07
Payer: COMMERCIAL

## 2025-07-07 DIAGNOSIS — C61 MALIGNANT NEOPLASM OF PROSTATE (MULTI): Primary | ICD-10-CM

## 2025-07-07 DIAGNOSIS — R39.9 LOWER URINARY TRACT SYMPTOMS (LUTS): ICD-10-CM

## 2025-07-07 PROCEDURE — 51798 US URINE CAPACITY MEASURE: CPT | Performed by: STUDENT IN AN ORGANIZED HEALTH CARE EDUCATION/TRAINING PROGRAM

## 2025-07-07 PROCEDURE — 99024 POSTOP FOLLOW-UP VISIT: CPT | Performed by: STUDENT IN AN ORGANIZED HEALTH CARE EDUCATION/TRAINING PROGRAM

## 2025-07-07 PROCEDURE — 99214 OFFICE O/P EST MOD 30 MIN: CPT | Performed by: STUDENT IN AN ORGANIZED HEALTH CARE EDUCATION/TRAINING PROGRAM

## 2025-07-07 NOTE — PROGRESS NOTES
UROLOGIC INITIAL EVALUATION     PROBLEM LIST:  1. Malignant neoplasm of prostate (Multi)  PSA      2. Lower urinary tract symptoms (LUTS)  Measure post void residual        HISTORY OF PRESENT ILLNESS:   Adryan Perez is a 50 y.o. male with past medical history of HTN, mixed HLD, prediabetes and elevated PSA. He presents for his initial consultation today via telehealth visit. Has previously seen JAYSHREE Gurrola. Recent PSA on 1/3/25 11.08 and 10.28 on 10/12/24. He is scheduled for TURP on 4/11/25.    Today, he reports feeling overall well. Reports stable urinary symptoms. He denies any fevers, chills, nausea, vomiting.     Interim history:  5/5/25: Patient states he is doing well. He is here to review his prostate biopsy today and discuss treatment plan. Notes no new complaints. He denies any fevers, chills, nausea, vomiting.    7/7/25: Today patient states he still has some abdominal muscle soreness that's intermittent. He denies any issues with appetite or BM. Patient denies any PJ, does have urinary frequency and urgency. He states when he has a strong urge if he does not get to the restroom fast enough he has minor leakage. Pt is currently taking cialis but has not attempted to have an erection. Denies any f/c/n/v.     PAST MEDICAL HISTORY:  Past Medical History:   Diagnosis Date    BPPV (benign paroxysmal positional vertigo)     Congenital hydrocephalus, unspecified     Elevated PSA     Essential (primary) hypertension     HLD (hyperlipidemia)     10/12/24 Chol 209, , Triglycerides 105    Old myocardial infarction     History of heart attack- 2005, no interventions, no issues since then, no longer follows with cardiology    Prediabetes        PAST SURGICAL HISTORY:  Past Surgical History:   Procedure Laterality Date    PROSTATE BIOPSY  04/2025    VENTRICULOPERITONEAL SHUNT      Since infancy, multiple revisions, last age 15    WISDOM TOOTH EXTRACTION          ALLERGIES:   No Known  Allergies     MEDICATIONS:     Current Outpatient Medications:     acetaminophen (Tylenol) 325 mg tablet, Take 2 tablets (650 mg) by mouth every 6 hours if needed for mild pain (1 - 3)., Disp: 30 tablet, Rfl: 0    amLODIPine (Norvasc) 10 mg tablet, Take 1 tablet (10 mg) by mouth once daily., Disp: 90 tablet, Rfl: 1    atenoloL-chlorthalidone (Tenoretic) 50-25 mg tablet, Take 1 tablet by mouth once daily., Disp: 90 tablet, Rfl: 0    ibuprofen 600 mg tablet, Take 1 tablet (600 mg) by mouth every 6 hours if needed for mild pain (1 - 3) for up to 30 doses., Disp: 30 tablet, Rfl: 0    multivitamin with minerals tablet, Take 1 tablet by mouth once daily., Disp: , Rfl:     tadalafil (Cialis) 5 mg tablet, Take 1 tablet (5 mg) by mouth once daily. Take 1 hour before activity as needed., Disp: 30 tablet, Rfl: 3      SOCIAL HISTORY:  Patient  reports that he quit smoking about 18 years ago. His smoking use included cigars. He has never used smokeless tobacco. He reports current alcohol use of about 1.0 standard drink of alcohol per week. He reports that he does not use drugs.   Social History     Socioeconomic History    Marital status: Single     Spouse name: Not on file    Number of children: Not on file    Years of education: Not on file    Highest education level: Not on file   Occupational History    Not on file   Tobacco Use    Smoking status: Former     Types: Cigars     Quit date: 2006     Years since quittin.6    Smokeless tobacco: Never   Vaping Use    Vaping status: Never Used   Substance and Sexual Activity    Alcohol use: Yes     Alcohol/week: 1.0 standard drink of alcohol     Types: 1 Cans of beer per week    Drug use: Never    Sexual activity: Not on file   Other Topics Concern    Not on file   Social History Narrative    Not on file     Social Drivers of Health     Financial Resource Strain: Low Risk  (2025)    Overall Financial Resource Strain (CARDIA)     Difficulty of Paying Living Expenses: Not  hard at all   Food Insecurity: No Food Insecurity (6/23/2025)    Hunger Vital Sign     Worried About Running Out of Food in the Last Year: Never true     Ran Out of Food in the Last Year: Never true   Transportation Needs: No Transportation Needs (6/23/2025)    PRAPARE - Transportation     Lack of Transportation (Medical): No     Lack of Transportation (Non-Medical): No   Physical Activity: Not on file   Stress: Not on file   Social Connections: Not on file   Intimate Partner Violence: Not At Risk (6/23/2025)    Humiliation, Afraid, Rape, and Kick questionnaire     Fear of Current or Ex-Partner: No     Emotionally Abused: No     Physically Abused: No     Sexually Abused: No   Housing Stability: Low Risk  (6/23/2025)    Housing Stability Vital Sign     Unable to Pay for Housing in the Last Year: No     Number of Times Moved in the Last Year: 0     Homeless in the Last Year: No       FAMILY HISTORY:  Family History   Problem Relation Name Age of Onset    Hypertension Mother      Heart attack Mother      Stroke Mother      Hypertension Father      Heart attack Father      Hypertension Brother      Hypertension Brother      Stroke Brother      No Known Problems Maternal Grandmother      Hypertension Maternal Grandfather      Hypertension Paternal Grandmother      Hypertension Paternal Grandfather      No Known Problems Daughter      No Known Problems Son      No Known Problems Mother's Sister      No Known Problems Mother's Brother      No Known Problems Father's Sister      No Known Problems Father's Brother         REVIEW OF SYSTEMS:  Constitutional: Negative for fever and chills. Denies anorexia, weight loss.  Eyes: Negative for visual disturbance.   Respiratory: Negative for shortness of breath.    Cardiovascular: Negative for chest pain.   Gastrointestinal: Negative for nausea and vomiting.   Genitourinary: See interval history above.  Skin: Negative for rash.   Neurological: Negative for dizziness and numbness.    Psychiatric/Behavioral: Negative for confusion and decreased concentration.     PHYSICAL EXAM:  NAD  AAOx3  Norm rate, good peripheral perfusion   Norm resp effort, symmetrical chest rise  Surgical incisions well healed, soft NTND    RADIOLOGY REVIEW:  MRI prostate 1/17/25  FINDINGS:  PROSTATE VOLUME:  The prostate measures 3.8 x 3.7 x 4.3 cm.  Prostate weight is estimated at 31g. PSA density is 0.35 ng/mL/g.      PROSTATE PARENCHYMA:  There is heterogeneous enlargement of the transition zone, consistent  with benign prostatic hyperplasia. A 1.0 x 0.8 cm ill-defined  fusiform T2 hypointense focal lesion is noted in the right posterior  midgland to base peripheral zone, showing focally restricted  diffusion, consistent with a PI-RADS 4 lesion. An additional  similar-appearing lesion is seen in the left anterior horn of the  midgland peripheral zone, measuring 0.7 x 0.7 cm, also scored as  PI-RADS 4.      EXTRACAPSULAR EXTENSION:  There is broad abutment of the adjacent prostatic capsule, without  bulging or irregularity to suggest gross extracapsular extension.      SEMINAL VESICLES:  Within normal limits.      PELVIC LYMPH NODES:  No abnormally enlarged pelvic lymph nodes are identified.      PERITONEUM:  No free or loculated fluid collections are evident in the pelvis.      OTHER ORGANS:  Within normal limits.      BONES:  No focal lesions are noted in the bone.      Exam Quality:  Is T2WI weighted imaging of diagnostic quality:  Yes. T2WI  assessment:  Adequate. Is DWI of diagnostic quality:  Yes. DWI  assessment:  Optimal. Is DCE of diagnostic quality:  N/A. DCE  assessment:  N/A. PI-QUAL score:  Two or more sequences independently  are of diagnostic quality Comments:      IMPRESSION:  1. Two PI-RADS 4 lesions in the right posterior paramedian midgland  peripheral zone and left anterior forearm of the midglandperipheral  zone. No definite signs of gross extracapsular extension.  2. No evidence of enlarged  pelvic lymph nodes.          I personally reviewed the images/study and I agree with the findings  as stated. This study was interpreted at [facility], [place], [place].      MACRO:  None      Signed by: Winston Tavarezana luisa 1/19/2025 5:10 PM  Dictation workstation:   [id number]    LABORATORY REVIEW:     Lab Results   Component Value Date    BUN 15 06/24/2025    CREATININE 1.24 06/24/2025    EGFR 70 06/24/2025     06/24/2025    K 4.1 06/24/2025     06/24/2025    CO2 27 06/24/2025    CALCIUM 8.6 06/24/2025      Lab Results   Component Value Date    WBC 7.6 06/24/2025    RBC 4.22 (L) 06/24/2025    HGB 13.2 (L) 06/24/2025    HCT 41.6 06/24/2025    MCV 99 06/24/2025    MCH 31.3 06/24/2025    MCHC 31.7 (L) 06/24/2025    RDW 13.0 06/24/2025     06/24/2025        Lab Results   Component Value Date    PSA 11.08 (H) 01/03/2025    PSA 4.35 (H) 02/19/2021    PSA 3.98 02/17/2020    PSA 1.83 01/12/2018             Assessment:     1. Malignant neoplasm of prostate (Multi)  PSA      2. Lower urinary tract symptoms (LUTS)  Measure post void residual        Plan:   We discussed his presentation in detail, PVR 40  Counseled patient on optimizing urinary and sexual recovery   Will follow-up on his pathology results and call him after these are received.   RTC in 4 weeks with PSA prior.    35 minutes total spent on patient's care today; >50% time spent on counseling/coordination of care    Scribe Attestation:  By signing my name below, I, Johnny Dorsey attest that this documentation has been prepared under the direction and in the presence of Ricky Quinteros MD.    Provider Attestation - Scribe documentation:  All medical record entries made by Norma Leach were at my direction and personally dictated by me, Ricky Quinteros MD. I have reviewed the chart and agree that the record is accurate and I confirmed that it reflects my personal performance of the history,  physical exam, discussion, and plan.

## 2025-07-17 LAB
LAB AP BLOCK FOR ADDITIONAL STUDIES: NORMAL
LABORATORY COMMENT REPORT: NORMAL
PATH REPORT.FINAL DX SPEC: NORMAL
PATH REPORT.GROSS SPEC: NORMAL
PATH REPORT.RELEVANT HX SPEC: NORMAL
PATH REPORT.TOTAL CANCER: NORMAL
PATHOLOGY SYNOPTIC REPORT: NORMAL

## 2025-07-25 LAB — PSA SERPL-MCNC: <0.04 NG/ML

## 2025-08-03 ENCOUNTER — APPOINTMENT (OUTPATIENT)
Dept: RADIOLOGY | Facility: HOSPITAL | Age: 50
End: 2025-08-03
Payer: COMMERCIAL

## 2025-08-03 ENCOUNTER — APPOINTMENT (OUTPATIENT)
Dept: CARDIOLOGY | Facility: HOSPITAL | Age: 50
End: 2025-08-03
Payer: COMMERCIAL

## 2025-08-03 ENCOUNTER — HOSPITAL ENCOUNTER (EMERGENCY)
Facility: HOSPITAL | Age: 50
Discharge: HOME | End: 2025-08-03
Attending: EMERGENCY MEDICINE
Payer: COMMERCIAL

## 2025-08-03 VITALS
TEMPERATURE: 97.2 F | OXYGEN SATURATION: 97 % | HEIGHT: 72 IN | BODY MASS INDEX: 31.83 KG/M2 | SYSTOLIC BLOOD PRESSURE: 115 MMHG | WEIGHT: 235 LBS | DIASTOLIC BLOOD PRESSURE: 73 MMHG | RESPIRATION RATE: 20 BRPM | HEART RATE: 75 BPM

## 2025-08-03 DIAGNOSIS — E87.6 HYPOKALEMIA: ICD-10-CM

## 2025-08-03 DIAGNOSIS — I82.812 THROMBOSIS OF LEFT SAPHENOUS VEIN: Primary | ICD-10-CM

## 2025-08-03 LAB
ALBUMIN SERPL BCP-MCNC: 3.9 G/DL (ref 3.4–5)
ALP SERPL-CCNC: 91 U/L (ref 33–120)
ALT SERPL W P-5'-P-CCNC: 18 U/L (ref 10–52)
ANION GAP SERPL CALC-SCNC: 10 MMOL/L (ref 10–20)
APTT PPP: 26 SECONDS (ref 26–36)
AST SERPL W P-5'-P-CCNC: 16 U/L (ref 9–39)
BASOPHILS # BLD AUTO: 0.07 X10*3/UL (ref 0–0.1)
BASOPHILS NFR BLD AUTO: 1.1 %
BILIRUB SERPL-MCNC: 0.3 MG/DL (ref 0–1.2)
BNP SERPL-MCNC: 40 PG/ML (ref 0–99)
BUN SERPL-MCNC: 14 MG/DL (ref 6–23)
CALCIUM SERPL-MCNC: 9 MG/DL (ref 8.6–10.3)
CARDIAC TROPONIN I PNL SERPL HS: 4 NG/L (ref 0–20)
CHLORIDE SERPL-SCNC: 102 MMOL/L (ref 98–107)
CO2 SERPL-SCNC: 28 MMOL/L (ref 21–32)
CREAT SERPL-MCNC: 1.05 MG/DL (ref 0.5–1.3)
EGFRCR SERPLBLD CKD-EPI 2021: 85 ML/MIN/1.73M*2
EOSINOPHIL # BLD AUTO: 0.29 X10*3/UL (ref 0–0.7)
EOSINOPHIL NFR BLD AUTO: 4.4 %
ERYTHROCYTE [DISTWIDTH] IN BLOOD BY AUTOMATED COUNT: 12.8 % (ref 11.5–14.5)
GLUCOSE SERPL-MCNC: 111 MG/DL (ref 74–99)
HCT VFR BLD AUTO: 36.8 % (ref 41–52)
HGB BLD-MCNC: 12 G/DL (ref 13.5–17.5)
IMM GRANULOCYTES # BLD AUTO: 0.01 X10*3/UL (ref 0–0.7)
IMM GRANULOCYTES NFR BLD AUTO: 0.2 % (ref 0–0.9)
INR PPP: 1 (ref 0.9–1.1)
LYMPHOCYTES # BLD AUTO: 1.56 X10*3/UL (ref 1.2–4.8)
LYMPHOCYTES NFR BLD AUTO: 23.9 %
MCH RBC QN AUTO: 30.8 PG (ref 26–34)
MCHC RBC AUTO-ENTMCNC: 32.6 G/DL (ref 32–36)
MCV RBC AUTO: 95 FL (ref 80–100)
MONOCYTES # BLD AUTO: 0.83 X10*3/UL (ref 0.1–1)
MONOCYTES NFR BLD AUTO: 12.7 %
NEUTROPHILS # BLD AUTO: 3.76 X10*3/UL (ref 1.2–7.7)
NEUTROPHILS NFR BLD AUTO: 57.7 %
NRBC BLD-RTO: 0 /100 WBCS (ref 0–0)
PLATELET # BLD AUTO: 324 X10*3/UL (ref 150–450)
POTASSIUM SERPL-SCNC: 3.3 MMOL/L (ref 3.5–5.3)
PROT SERPL-MCNC: 7.4 G/DL (ref 6.4–8.2)
PROTHROMBIN TIME: 10.9 SECONDS (ref 9.8–12.4)
RBC # BLD AUTO: 3.89 X10*6/UL (ref 4.5–5.9)
SODIUM SERPL-SCNC: 137 MMOL/L (ref 136–145)
WBC # BLD AUTO: 6.5 X10*3/UL (ref 4.4–11.3)

## 2025-08-03 PROCEDURE — 71275 CT ANGIOGRAPHY CHEST: CPT

## 2025-08-03 PROCEDURE — 80053 COMPREHEN METABOLIC PANEL: CPT

## 2025-08-03 PROCEDURE — 99285 EMERGENCY DEPT VISIT HI MDM: CPT | Mod: 25 | Performed by: EMERGENCY MEDICINE

## 2025-08-03 PROCEDURE — 36415 COLL VENOUS BLD VENIPUNCTURE: CPT | Performed by: EMERGENCY MEDICINE

## 2025-08-03 PROCEDURE — 2500000001 HC RX 250 WO HCPCS SELF ADMINISTERED DRUGS (ALT 637 FOR MEDICARE OP)

## 2025-08-03 PROCEDURE — 93971 EXTREMITY STUDY: CPT | Performed by: RADIOLOGY

## 2025-08-03 PROCEDURE — 85610 PROTHROMBIN TIME: CPT

## 2025-08-03 PROCEDURE — 84484 ASSAY OF TROPONIN QUANT: CPT

## 2025-08-03 PROCEDURE — 93971 EXTREMITY STUDY: CPT

## 2025-08-03 PROCEDURE — 85025 COMPLETE CBC W/AUTO DIFF WBC: CPT

## 2025-08-03 PROCEDURE — 2550000001 HC RX 255 CONTRASTS: Performed by: EMERGENCY MEDICINE

## 2025-08-03 PROCEDURE — 2500000002 HC RX 250 W HCPCS SELF ADMINISTERED DRUGS (ALT 637 FOR MEDICARE OP, ALT 636 FOR OP/ED)

## 2025-08-03 PROCEDURE — 93005 ELECTROCARDIOGRAM TRACING: CPT

## 2025-08-03 PROCEDURE — 83880 ASSAY OF NATRIURETIC PEPTIDE: CPT | Performed by: EMERGENCY MEDICINE

## 2025-08-03 PROCEDURE — 71275 CT ANGIOGRAPHY CHEST: CPT | Performed by: RADIOLOGY

## 2025-08-03 RX ORDER — POTASSIUM CHLORIDE 20 MEQ/1
40 TABLET, EXTENDED RELEASE ORAL ONCE
Status: COMPLETED | OUTPATIENT
Start: 2025-08-03 | End: 2025-08-03

## 2025-08-03 RX ADMIN — APIXABAN 10 MG: 5 TABLET, FILM COATED ORAL at 17:24

## 2025-08-03 RX ADMIN — IOHEXOL 75 ML: 350 INJECTION, SOLUTION INTRAVENOUS at 16:19

## 2025-08-03 RX ADMIN — POTASSIUM CHLORIDE EXTENDED-RELEASE 40 MEQ: 1500 TABLET ORAL at 17:11

## 2025-08-03 ASSESSMENT — PAIN DESCRIPTION - ORIENTATION: ORIENTATION: LEFT

## 2025-08-03 ASSESSMENT — PAIN SCALES - GENERAL: PAINLEVEL_OUTOF10: 7

## 2025-08-03 ASSESSMENT — PAIN DESCRIPTION - LOCATION: LOCATION: LEG

## 2025-08-03 ASSESSMENT — PAIN - FUNCTIONAL ASSESSMENT: PAIN_FUNCTIONAL_ASSESSMENT: 0-10

## 2025-08-03 ASSESSMENT — PAIN DESCRIPTION - PAIN TYPE: TYPE: ACUTE PAIN

## 2025-08-03 NOTE — DISCHARGE INSTRUCTIONS
Return to the ED immediately if new or worsening signs or symptoms  Please follow-up with your primary care doctor within 3 days

## 2025-08-03 NOTE — ED PROVIDER NOTES
HPI   Chief Complaint   Patient presents with    Leg Swelling       50-year-old male presents to the ED today with a chief concern of left lower extremity swelling.  Patient reports he had prostate surgery on June 23.  Noticed 2 days ago his left lower extremity started swelling.  Reports the swelling is increased.  He denies any history of PE or DVT.  He reports that he did recently have surgery.  No numbness or tingling.  Reports it feels tight.  He denies chest pain or shortness of breath.  Denies fevers.  No other symptoms or concerns at this time.      History provided by:  Patient   used: No            Patient History   Medical History[1]  Surgical History[2]  Family History[3]  Social History[4]    Physical Exam   ED Triage Vitals [08/03/25 1253]   Temperature Heart Rate Respirations BP   37.1 °C (98.7 °F) (!) 105 15 (!) 142/96      Pulse Ox Temp Source Heart Rate Source Patient Position   97 % Temporal -- --      BP Location FiO2 (%)     -- --       Physical Exam  constitutional: Vital signs per nursing notes.  Well developed, well nourished.  No acute distress.    Psychiatric: alert and oriented to person, place, and time; no abnormalities of mood or affect; memory intact  Eyes: PERRL; conjunctivae and lids normal; EOMI  ENT: Ears normal externally; face symmetric. voice normal  Neck: neck supple, no meningismus; trachea midline without deviation  Respiratory: normal respiratory effort and excursion; no rales, rhonchi, or wheezes; equal air entry  Cardiovascular: Tachycardic at a regular rhythm. 2+ pulses extremities   Neurological: normal speech; CN II-XII grossly intact, normal motor and sensory function  GI: no distention, soft, nontender  : Deferred  Musculoskeletal: normal gait and station; full range of motion of bilateral ankles and knee no focal bony tenderness.  There is 3+ pitting peripheral edema in the left lower extremity.  2+ symmetric dorsalis pedis and posterior tibial  pulses.  Compartments are soft.  No cyanosis.  Skin: normal to inspection; normal to palpation; no rash    ED Course & MDM   ED Course as of 08/03/25 1754   Sun Aug 03, 2025   1412 IMPRESSION:  Occlusive thrombus proximal to mid left greater saphenous vein.  No deep venous thrombosis left lower extremity.      MACRO:  None      Signed by: Marine Sorensen 8/3/2025 1:51 PM  Dictation workstation:   [id number]   []   1420 IMPRESSION:  Occlusive thrombus proximal to mid left greater saphenous vein.  No deep venous thrombosis left lower extremity.      MACRO:  None      Signed by: Marine Sorensen 8/3/2025 1:51 PM  Dictation workstation:   [id number]   [MC]   1534 Patient reported to me that he has experienced left leg pain and swelling for the past few days.  No current chest pain or shortness of breath.  I reviewed urology office visit note on 6/30/2025.  History of hypertension, hyperlipidemia, prediabetes, prostate cancer status post robot-assisted prostatectomy with lysis of adhesions and bilateral pelvic lymph node dissection on 6/23/2025.  Triage vital signs notable for blood pressure 142/96 and heart rate 105.  Exam notable for left lower extremity swelling and mild tenderness to palpation of the calf and medial left thigh.  No abdominal pain reported and no tenderness of the abdomen or pelvis.  Lower extremity duplex demonstrated occlusive thrombus of the proximal left greater saphenous vein.  Given proximity to common femoral vein, I explained to patient that we will treat as a deep vein thrombosis with anticoagulation.  Given tachycardia, ordered labs and CT angio chest to exclude pulmonary embolism.  Patient reported no hematuria or other bleeding.  No history of DVT.  No current anticoagulation. [MC-2]   1615 ECG 12 lead  ECG interpreted by me.  Performed August 3, 2020 25th at 1606.  Sinus rhythm 74 bpm.  , QRS 98, QTc 441.  No ST elevation. [MC-2]   1709 IMPRESSION:  1. No evidence of acute  pulmonary embolism. No acute process. No  significant interval changes since prior.  2. Stable to slight interval improvement in left axillary  lymphadenopathy and stable mediastinal lymphadenopathy. Clinical  correlation and attention in subsequent follow-up studies recommended.  3. Additional detailed nonacute findings as above.          MACRO:  None      Signed by: Delroychad Dharmesh 8/3/2025 5:03 PM  Dictation workstation:   [id number]   []   4637 I personally interpreted the EKG.  Ventricular rate 74 bpm.  MD interval 196 ms.  QRS duration 98 ms.  QT/QTc 398/441 ms.  Patient is in normal sinus rhythm at a rate of 74 bpm.  Normal axis.  There is good R wave progression.  No right or left bundle branch block.  No ST elevations.  Compared with previous EKG grossly unchanged. []      ED Course User Index  [] Jaspreet Mathur PA-C  [-2] Mathew Hernandez MD         Diagnoses as of 08/03/25 1754   Thrombosis of left saphenous vein   Hypokalemia                 No data recorded     Tammi Coma Scale Score: 15 (08/03/25 1253 : Charmaine Gonzalez RN)                           Medical Decision Making  50-year-old male presents to the ED today with a chief concern of left lower extremity swelling.vital signs are reassuring.  Patient overall appears well and is nontoxic-appearing.  Patient has full range of motion of the neck without meningismus.  Satting well on room air.  Not hypoxic.  Not tachycardic.  Afebrile.  Was found to have an occlusive DVT in the left saphenous vein.  No DVT noted.  CT PE study shows no evidence of PE.  Has symmetric pulses in the lower extremities.  He was given his first dose of Eliquis in the ED.  Will start on Eliquis outpatient.  Patient was seen and staffed with ED attending physician.  Discussed that this is a blood thinner.  Discussed uses and possible side effects of this medication.  Discussed with patient that if he falls especially if he has his head he needs to return to the ED for  [Follow-Up Visit] : a follow-up visit for further evaluation and treatment.    Differential diagnosis: Ligamentous injury, fracture, dislocation, abscess, cellulitis, lymphangitis, DVT, acute arterial occlusion, necrotizing fasciitis, arthritis, crystal arthropathy, septic arthritis, tendon rupture    Shared decision-making was used patient feels comfortable returning home     Patient was advised to follow up with recommended provider in 1 day for another evaluation and exam. I advised patient/guardian to return or go to closest emergency room immediately if symptoms change, get worse, new symptoms develop prior to follow up. If there is no improvement in symptoms in the next 24 hours they are advised to return for further evaluation and exam. I also explained the plan and treatment course. Patient/guardian is in agreement with plan, treatment course, and follow up and states verbally that they will comply.    Patient is homegoing. I discussed the differential; results and discharge plan with the patient and/or family/friend/caregiver if present.  I emphasized the importance of follow-up with the physician I referred them to in the timeframe recommended.  I explained reasons for the patient to return to the Emergency Department. They agreed that if they feel their condition is worsening or if they have any other concern they should call 911 immediately for further assistance. I gave the patient an opportunity to ask all questions they had and answered all of them accordingly. They understand return precautions and discharge instructions. The patient and/or family/friend/caregiver expressed understanding verbally and that they would comply.        This note has been transcribed using voice recognition and may contain grammatical errors, misplaced words, incorrect words, incorrect phrases or other errors.        Procedure  Procedures       [1]   Past Medical History:  Diagnosis Date    BPPV (benign paroxysmal positional vertigo)     Congenital hydrocephalus,  [Morbid Obesity (BMI>40)] : morbid obesity (bmi>40) unspecified     Elevated PSA     Essential (primary) hypertension     HLD (hyperlipidemia)     10/12/24 Chol 209, , Triglycerides 105    Old myocardial infarction     History of heart attack- , no interventions, no issues since then, no longer follows with cardiology    Prediabetes    [2]   Past Surgical History:  Procedure Laterality Date    PROSTATE BIOPSY  2025    VENTRICULOPERITONEAL SHUNT      Since infancy, multiple revisions, last age 15    WISDOM TOOTH EXTRACTION     [3]   Family History  Problem Relation Name Age of Onset    Hypertension Mother      Heart attack Mother      Stroke Mother      Hypertension Father      Heart attack Father      Hypertension Brother      Hypertension Brother      Stroke Brother      No Known Problems Maternal Grandmother      Hypertension Maternal Grandfather      Hypertension Paternal Grandmother      Hypertension Paternal Grandfather      No Known Problems Daughter      No Known Problems Son      No Known Problems Mother's Sister      No Known Problems Mother's Brother      No Known Problems Father's Sister      No Known Problems Father's Brother     [4]   Social History  Tobacco Use    Smoking status: Former     Types: Cigars     Quit date: 2006     Years since quittin.6    Smokeless tobacco: Never   Vaping Use    Vaping status: Never Used   Substance Use Topics    Alcohol use: Yes     Alcohol/week: 1.0 standard drink of alcohol     Types: 1 Cans of beer per week    Drug use: Never        Jaspreet Mathur PA-C  25 [id number]

## 2025-08-03 NOTE — ED TRIAGE NOTES
Pt to ED from home for left leg swelling since Thursday. Pt endorses having prostate surgery on June 23rd. Pt denies history of blood clots and does not take a blood thinner.

## 2025-08-04 ENCOUNTER — APPOINTMENT (OUTPATIENT)
Dept: UROLOGY | Facility: HOSPITAL | Age: 50
End: 2025-08-04
Payer: COMMERCIAL

## 2025-08-04 DIAGNOSIS — N52.31 ERECTILE DYSFUNCTION AFTER RADICAL PROSTATECTOMY: Primary | ICD-10-CM

## 2025-08-04 DIAGNOSIS — C61 MALIGNANT NEOPLASM OF PROSTATE (MULTI): ICD-10-CM

## 2025-08-04 LAB
ATRIAL RATE: 74 BPM
P AXIS: 52 DEGREES
P OFFSET: 172 MS
P ONSET: 119 MS
PR INTERVAL: 196 MS
Q ONSET: 217 MS
QRS COUNT: 13 BEATS
QRS DURATION: 98 MS
QT INTERVAL: 398 MS
QTC CALCULATION(BAZETT): 441 MS
QTC FREDERICIA: 427 MS
R AXIS: 31 DEGREES
T AXIS: 33 DEGREES
T OFFSET: 416 MS
VENTRICULAR RATE: 74 BPM

## 2025-08-04 PROCEDURE — 99024 POSTOP FOLLOW-UP VISIT: CPT | Performed by: STUDENT IN AN ORGANIZED HEALTH CARE EDUCATION/TRAINING PROGRAM

## 2025-08-04 RX ORDER — TADALAFIL 20 MG/1
20 TABLET ORAL DAILY PRN
Qty: 15 TABLET | Refills: 3 | Status: SHIPPED | OUTPATIENT
Start: 2025-08-04 | End: 2025-08-07 | Stop reason: SDUPTHER

## 2025-08-04 NOTE — PROGRESS NOTES
UROLOGIC INITIAL EVALUATION     PROBLEM LIST:  1. Erectile dysfunction after radical prostatectomy  tadalafil 20 mg tablet      2. Malignant neoplasm of prostate (Multi)  Prostate Specific Antigen, Screen    Prostate Specific Antigen, Screen        HISTORY OF PRESENT ILLNESS:   Adryan Perez is a 50 y.o. male with past medical history of HTN, mixed HLD, prediabetes and elevated PSA. He presents for his initial consultation today via telehealth visit. Has previously seen JAYSHREE Gurrola. Recent PSA on 1/3/25 11.08 and 10.28 on 10/12/24. He is scheduled for TURP on 4/11/25.    Today, he reports feeling overall well. Reports stable urinary symptoms. He denies any fevers, chills, nausea, vomiting.     Interim history:  5/5/25: Patient states he is doing well. He is here to review his prostate biopsy today and discuss treatment plan. Notes no new complaints. He denies any fevers, chills, nausea, vomiting.    7/7/25: Today patient states he still has some abdominal muscle soreness that's intermittent. He denies any issues with appetite or BM. Patient denies any PJ, does have urinary frequency and urgency. He states when he has a strong urge if he does not get to the restroom fast enough he has minor leakage. Pt is currently taking cialis but has not attempted to have an erection. Denies any f/c/n/v.     8/4/25: This is a telehealth visit. The patient has been doing well since last visit. He was using the bathroom and he had a weird zapping feeling to his penis which lasted around 8 minutes. This happened last Wednesday. This has not happened since then.   He reports intermittent urinary incontinence, especially when laughing or coughing. He wears 2-3 pull ups a day. Other than this he has no other urinary symptoms. He continues doing Kegel exercises around 8x a day.     Yesterday, he went to the emergency room for severe leg swelling. He was told he had blood clots in his leg. Images were obtained. He was  sent home with eliquis.     He has not had an erection since surgery.   The patient notes he has a diminished appetite since his surgery. He eats once a day. The patient notes that he is losing a lot of weight because of his decreased appetite.     PAST MEDICAL HISTORY:  Past Medical History:   Diagnosis Date    BPPV (benign paroxysmal positional vertigo)     Congenital hydrocephalus, unspecified     Elevated PSA     Essential (primary) hypertension     HLD (hyperlipidemia)     10/12/24 Chol 209, , Triglycerides 105    Old myocardial infarction     History of heart attack- 2005, no interventions, no issues since then, no longer follows with cardiology    Prediabetes        PAST SURGICAL HISTORY:  Past Surgical History:   Procedure Laterality Date    PROSTATE BIOPSY  04/2025    VENTRICULOPERITONEAL SHUNT      Since infancy, multiple revisions, last age 15    WISDOM TOOTH EXTRACTION          ALLERGIES:   No Known Allergies     MEDICATIONS:     Current Outpatient Medications:     acetaminophen (Tylenol) 325 mg tablet, Take 2 tablets (650 mg) by mouth every 6 hours if needed for mild pain (1 - 3)., Disp: 30 tablet, Rfl: 0    amLODIPine (Norvasc) 10 mg tablet, Take 1 tablet (10 mg) by mouth once daily., Disp: 90 tablet, Rfl: 1    apixaban (Eliquis) 5 mg (74 tabs) tablet, Take 2 tablets (10 mg) by mouth 2 times a day for 7 days, then take 1 tablet (5 mg) by mouth 2 times a day., Disp: 74 tablet, Rfl: 0    atenoloL-chlorthalidone (Tenoretic) 50-25 mg tablet, Take 1 tablet by mouth once daily., Disp: 90 tablet, Rfl: 0    ibuprofen 600 mg tablet, Take 1 tablet (600 mg) by mouth every 6 hours if needed for mild pain (1 - 3) for up to 30 doses., Disp: 30 tablet, Rfl: 0    multivitamin with minerals tablet, Take 1 tablet by mouth once daily., Disp: , Rfl:     tadalafil (Cialis) 5 mg tablet, Take 1 tablet (5 mg) by mouth once daily. Take 1 hour before activity as needed., Disp: 30 tablet, Rfl: 3  No current  facility-administered medications for this visit.      SOCIAL HISTORY:  Patient  reports that he quit smoking about 18 years ago. His smoking use included cigars. He has never used smokeless tobacco. He reports current alcohol use of about 1.0 standard drink of alcohol per week. He reports that he does not use drugs.   Social History     Socioeconomic History    Marital status: Single     Spouse name: Not on file    Number of children: Not on file    Years of education: Not on file    Highest education level: Not on file   Occupational History    Not on file   Tobacco Use    Smoking status: Former     Types: Cigars     Quit date: 2006     Years since quittin.6    Smokeless tobacco: Never   Vaping Use    Vaping status: Never Used   Substance and Sexual Activity    Alcohol use: Yes     Alcohol/week: 1.0 standard drink of alcohol     Types: 1 Cans of beer per week    Drug use: Never    Sexual activity: Not on file   Other Topics Concern    Not on file   Social History Narrative    Not on file     Social Drivers of Health     Financial Resource Strain: Low Risk  (2025)    Overall Financial Resource Strain (CARDIA)     Difficulty of Paying Living Expenses: Not hard at all   Food Insecurity: No Food Insecurity (2025)    Hunger Vital Sign     Worried About Running Out of Food in the Last Year: Never true     Ran Out of Food in the Last Year: Never true   Transportation Needs: No Transportation Needs (2025)    PRAPARE - Transportation     Lack of Transportation (Medical): No     Lack of Transportation (Non-Medical): No   Physical Activity: Not on file   Stress: Not on file   Social Connections: Not on file   Intimate Partner Violence: Not At Risk (2025)    Humiliation, Afraid, Rape, and Kick questionnaire     Fear of Current or Ex-Partner: No     Emotionally Abused: No     Physically Abused: No     Sexually Abused: No   Housing Stability: Low Risk  (2025)    Housing Stability Vital Sign      Unable to Pay for Housing in the Last Year: No     Number of Times Moved in the Last Year: 0     Homeless in the Last Year: No       FAMILY HISTORY:  Family History   Problem Relation Name Age of Onset    Hypertension Mother      Heart attack Mother      Stroke Mother      Hypertension Father      Heart attack Father      Hypertension Brother      Hypertension Brother      Stroke Brother      No Known Problems Maternal Grandmother      Hypertension Maternal Grandfather      Hypertension Paternal Grandmother      Hypertension Paternal Grandfather      No Known Problems Daughter      No Known Problems Son      No Known Problems Mother's Sister      No Known Problems Mother's Brother      No Known Problems Father's Sister      No Known Problems Father's Brother         REVIEW OF SYSTEMS:  Constitutional: Negative for fever and chills. Denies anorexia, weight loss.  Eyes: Negative for visual disturbance.   Respiratory: Negative for shortness of breath.    Cardiovascular: Negative for chest pain.   Gastrointestinal: Negative for nausea and vomiting.   Genitourinary: See interval history above.  Skin: Negative for rash.   Neurological: Negative for dizziness and numbness.   Psychiatric/Behavioral: Negative for confusion and decreased concentration.     PHYSICAL EXAM:  NAD  AAOx3  Norm rate, good peripheral perfusion   Norm resp effort, symmetrical chest rise  Surgical incisions well healed, soft NTND    RADIOLOGY REVIEW:  MRI prostate 1/17/25  FINDINGS:  PROSTATE VOLUME:  The prostate measures 3.8 x 3.7 x 4.3 cm.  Prostate weight is estimated at 31g. PSA density is 0.35 ng/mL/g.      PROSTATE PARENCHYMA:  There is heterogeneous enlargement of the transition zone, consistent  with benign prostatic hyperplasia. A 1.0 x 0.8 cm ill-defined  fusiform T2 hypointense focal lesion is noted in the right posterior  midgland to base peripheral zone, showing focally restricted  diffusion, consistent with a PI-RADS 4 lesion. An  additional  similar-appearing lesion is seen in the left anterior horn of the  midgland peripheral zone, measuring 0.7 x 0.7 cm, also scored as  PI-RADS 4.      EXTRACAPSULAR EXTENSION:  There is broad abutment of the adjacent prostatic capsule, without  bulging or irregularity to suggest gross extracapsular extension.      SEMINAL VESICLES:  Within normal limits.      PELVIC LYMPH NODES:  No abnormally enlarged pelvic lymph nodes are identified.      PERITONEUM:  No free or loculated fluid collections are evident in the pelvis.      OTHER ORGANS:  Within normal limits.      BONES:  No focal lesions are noted in the bone.      Exam Quality:  Is T2WI weighted imaging of diagnostic quality:  Yes. T2WI  assessment:  Adequate. Is DWI of diagnostic quality:  Yes. DWI  assessment:  Optimal. Is DCE of diagnostic quality:  N/A. DCE  assessment:  N/A. PI-QUAL score:  Two or more sequences independently  are of diagnostic quality Comments:      IMPRESSION:  1. Two PI-RADS 4 lesions in the right posterior paramedian midgland  peripheral zone and left anterior forearm of the midglandperipheral  zone. No definite signs of gross extracapsular extension.  2. No evidence of enlarged pelvic lymph nodes.          I personally reviewed the images/study and I agree with the findings  as stated. This study was interpreted at [place], [place].      MACRO:  None      Signed by: Winston Diego 1/19/2025 5:10 PM  Dictation workstation:   [id number]    LABORATORY REVIEW:     Lab Results   Component Value Date    BUN 14 08/03/2025    CREATININE 1.05 08/03/2025    EGFR 85 08/03/2025     08/03/2025    K 3.3 (L) 08/03/2025     08/03/2025    CO2 28 08/03/2025    CALCIUM 9.0 08/03/2025      Lab Results   Component Value Date    WBC 6.5 08/03/2025    RBC 3.89 (L) 08/03/2025    HGB 12.0 (L) 08/03/2025    HCT 36.8 (L) 08/03/2025    MCV 95 08/03/2025    MCH 30.8 08/03/2025     MCHC 32.6 08/03/2025    RDW 12.8 08/03/2025     08/03/2025        Lab Results   Component Value Date    PSA <0.04 07/24/2025    PSA 11.08 (H) 01/03/2025    PSA 4.35 (H) 02/19/2021    PSA 3.98 02/17/2020    PSA 1.83 01/12/2018             Assessment:     1. Erectile dysfunction after radical prostatectomy  tadalafil 20 mg tablet      2. Malignant neoplasm of prostate (Multi)  Prostate Specific Antigen, Screen    Prostate Specific Antigen, Screen        Plan:   We discussed his presentation in detail.   I reviewed and interpreted patients pathology results.   Counseled patient on optimizing urinary and sexual recovery   Continue Kegel exercises to help with urine leakage.  Cialis 5 mg daily and 20 mg every other day.   The patient will keep a log of his weight.   PSA every 3 months.  RTC 3 months.     31 minutes total spent on patient's care today; >50% time spent on counseling/coordination of care    Scribe Attestation:  By signing my name below, I, Johnny Dorsey attest that this documentation has been prepared under the direction and in the presence of Ricky Quinteros MD.    Provider Attestation - Scribe documentation:  All medical record entries made by Norma Leach were at my direction and personally dictated by me, Ricky Quinteros MD. I have reviewed the chart and agree that the record is accurate and I confirmed that it reflects my personal performance of the history, physical exam, discussion, and plan.

## 2025-08-07 DIAGNOSIS — C61 MALIGNANT NEOPLASM OF PROSTATE (MULTI): ICD-10-CM

## 2025-08-07 DIAGNOSIS — N52.31 ERECTILE DYSFUNCTION AFTER RADICAL PROSTATECTOMY: ICD-10-CM

## 2025-08-08 RX ORDER — TADALAFIL 20 MG/1
20 TABLET ORAL EVERY OTHER DAY
Qty: 15 TABLET | Refills: 2 | Status: SHIPPED | OUTPATIENT
Start: 2025-08-08 | End: 2025-11-06

## 2025-08-14 ENCOUNTER — OFFICE VISIT (OUTPATIENT)
Dept: PRIMARY CARE | Facility: CLINIC | Age: 50
End: 2025-08-14
Payer: COMMERCIAL

## 2025-08-14 VITALS
WEIGHT: 245 LBS | HEIGHT: 72 IN | BODY MASS INDEX: 33.18 KG/M2 | HEART RATE: 93 BPM | SYSTOLIC BLOOD PRESSURE: 128 MMHG | DIASTOLIC BLOOD PRESSURE: 78 MMHG | OXYGEN SATURATION: 95 %

## 2025-08-14 DIAGNOSIS — I82.402 ACUTE DEEP VEIN THROMBOSIS (DVT) OF LEFT LOWER EXTREMITY, UNSPECIFIED VEIN: Primary | ICD-10-CM

## 2025-08-14 PROCEDURE — 3078F DIAST BP <80 MM HG: CPT | Performed by: STUDENT IN AN ORGANIZED HEALTH CARE EDUCATION/TRAINING PROGRAM

## 2025-08-14 PROCEDURE — 3008F BODY MASS INDEX DOCD: CPT | Performed by: STUDENT IN AN ORGANIZED HEALTH CARE EDUCATION/TRAINING PROGRAM

## 2025-08-14 PROCEDURE — 99214 OFFICE O/P EST MOD 30 MIN: CPT | Performed by: STUDENT IN AN ORGANIZED HEALTH CARE EDUCATION/TRAINING PROGRAM

## 2025-08-14 PROCEDURE — 3074F SYST BP LT 130 MM HG: CPT | Performed by: STUDENT IN AN ORGANIZED HEALTH CARE EDUCATION/TRAINING PROGRAM

## 2025-12-15 ENCOUNTER — APPOINTMENT (OUTPATIENT)
Dept: PRIMARY CARE | Facility: CLINIC | Age: 50
End: 2025-12-15
Payer: COMMERCIAL

## (undated) PROCEDURE — 0VT04ZZ RESECTION OF PROSTATE, PERCUTANEOUS ENDOSCOPIC APPROACH: ICD-10-PCS

## (undated) PROCEDURE — 8E0W4CZ ROBOTIC ASSISTED PROCEDURE OF TRUNK REGION, PERCUTANEOUS ENDOSCOPIC APPROACH: ICD-10-PCS

## (undated) PROCEDURE — 07BC4ZZ EXCISION OF PELVIS LYMPHATIC, PERCUTANEOUS ENDOSCOPIC APPROACH: ICD-10-PCS

## (undated) PROCEDURE — 0TSD4ZZ REPOSITION URETHRA, PERCUTANEOUS ENDOSCOPIC APPROACH: ICD-10-PCS

## (undated) DEVICE — Device

## (undated) DEVICE — GLOVE, SURGEON, PREMIERPRO PI, MICRO, SZ-8.0, PF, WH

## (undated) DEVICE — FORCEPS, PROGRASP, DAVINCI XI

## (undated) DEVICE — SYRINGE, 12 CC, LUER LOCK, CONTROL, MONOJECT

## (undated) DEVICE — SUTURE, MONOCRYL, 27 IN, 3-0 SH, VIOLET

## (undated) DEVICE — RETRIEVAL SYSTEM, MONARCH, 10MM DISP ENDOSCOPIC

## (undated) DEVICE — DRESSING, TRANSPARENT, TEGADERM, 8 X 12 IN

## (undated) DEVICE — PROBE COVER, ULTRASOUND 8818

## (undated) DEVICE — SHEAR, W/UNIPOLAR CAUTERY, ENDOSHEAR, 5 MM

## (undated) DEVICE — SCISSORS, MONOPOLAR, CURVED, 8MM

## (undated) DEVICE — NEEDLE, HYPODERMIC, MONOJECT, 18 G X 1.5 IN

## (undated) DEVICE — GOWN, SURGICAL, SMARTGOWN, XX-LARGE, STERILE

## (undated) DEVICE — SUTURE, MONOCRYL, 4-0, 18 IN, PS2, UNDYED

## (undated) DEVICE — ACCESS SYSTEM, PRECISIONPOINT, TRANSPERINEAL

## (undated) DEVICE — OBTURATOR, BLADELESS , SU

## (undated) DEVICE — ACCESS PORT, 12MM, 120MM LENGTH, LOW PROFILE W/BLADELESS OPTICAL TIP

## (undated) DEVICE — POSITIONING KIT, PAGAZZI, PINK PAD XL, W/ ARM AND HEAD REST

## (undated) DEVICE — SUTURE, MONOCRYL, 3-0, 27 IN, SH, UNDYED

## (undated) DEVICE — ACCESS SYS, KII SHIELDED BLADED, Z-THREAD, 12X100CM

## (undated) DEVICE — COVER, TIP HOT SHEARS ENDOWRIST

## (undated) DEVICE — CONTAINER, SPECIMEN, 120 ML, STERILE

## (undated) DEVICE — CATHETER, URETHRAL, FOLEY, 2 WAY, BARDEX IC, 18 FR, 5 CC, SILVER, LATEX

## (undated) DEVICE — DRIVER, NEEDLE LARGE, DAVINCI XI

## (undated) DEVICE — FORCEPS, BIPOLAR MARYLAND, DAVINCI XI

## (undated) DEVICE — DRAPE, COLUMN, DAVINCI XI

## (undated) DEVICE — CLEAN KIT, ANTIFOG SCOPE, SEE SHARP 150MM

## (undated) DEVICE — SYRINGE, 60 CC, IRRIGATION, PISTON, CATH TIP, W/LUER ADAPTER,DISP

## (undated) DEVICE — STAPLER, SKIN PROXIMATE, 35 WIDE

## (undated) DEVICE — NEEDLE, INSUFFLATION, 13GAX120MM, DISP

## (undated) DEVICE — NEEDLE, BIOPSY, MAX CORE, 18G

## (undated) DEVICE — NEEDLE, SPINAL, QUINCKE, LUER LOCK, 18 G X 6 IN

## (undated) DEVICE — SEAL, UNIVERSAL, 5-12MM

## (undated) DEVICE — SUTURE, STRATAFIX, 3-0 SPIRAL PDS PLUS, 15CM SH VIOLET

## (undated) DEVICE — SUTURE, VICRYL, 4-0, 27IN, RB-1

## (undated) DEVICE — DRESSING, NON-ADHERENT, TELFA, OUCHLESS, 3 X 8 IN, STERILE

## (undated) DEVICE — SUTURE, VICRYL PLUS, 0, 27IN, UR-6, VIOLET, BRAIDED

## (undated) DEVICE — DRESSING, GAUZE, 16 PLY, 4 X 4 IN, STERILE

## (undated) DEVICE — URONAV PROBE HOLDER

## (undated) DEVICE — MARKER, SKIN, DUAL TIP INK W/9 LABEL AND REMOVABLE TIME OUT SLEEVE

## (undated) DEVICE — TUBING SET, BIFURCATED, SMOKE EVAC, ACTIVATED CHARCOAL FILTERED, F/AIRSEAL

## (undated) DEVICE — APPLICATOR, CHLORAPREP, W/ORANGE TINT, 26ML

## (undated) DEVICE — ADHESIVE, SKIN, DERMABOND ADVANCED, 15CM, PEN-STYLE

## (undated) DEVICE — DRAPE, SHEET, THREE QUARTER, FAN FOLD, 57 X 77 IN

## (undated) DEVICE — CLIP, LIGATING, HEM-O-LOCK, MLX, LARGE, LF, PURPLE

## (undated) DEVICE — SYRINGE, 60 CC, IRRIGATION, BULB, CONTRO-BULB, PAPER POUCH

## (undated) DEVICE — SEALANT, HEMOSTATIC, FLOSEAL, 10 ML

## (undated) DEVICE — DRAPE, ARM XI

## (undated) DEVICE — SPONGE, LAP, XRAY DECT, SC+RFID, 4X18, STERILE

## (undated) DEVICE — APPLICATOR, ENDOSCOPIC FLOSEAL

## (undated) DEVICE — SPONGE, HEMOSTATIC, CELLULOSE, SURGICEL, 4 X 8 IN

## (undated) DEVICE — TOWEL, SURGICAL, NEURO, O/R, 16 X 26, BLUE, STERILE

## (undated) DEVICE — ACCESS SYS, KII SHIELDED BLADED, Z-THREAD, 5X100CM

## (undated) DEVICE — PUMP, STRYKERFLOW 2 & HANDPIECE W/10FT. IRRIGATION TUBING

## (undated) DEVICE — TUBE SET, PNEUMOCLEAR, SMOKE EVACU, HIGH-FLOW

## (undated) DEVICE — FORCEPS, BIPOLAR FENESTRATED XI